# Patient Record
Sex: FEMALE | Race: WHITE | NOT HISPANIC OR LATINO | Employment: OTHER | URBAN - METROPOLITAN AREA
[De-identification: names, ages, dates, MRNs, and addresses within clinical notes are randomized per-mention and may not be internally consistent; named-entity substitution may affect disease eponyms.]

---

## 2017-01-05 ENCOUNTER — ALLSCRIPTS OFFICE VISIT (OUTPATIENT)
Dept: OTHER | Facility: OTHER | Age: 82
End: 2017-01-05

## 2017-02-02 ENCOUNTER — ALLSCRIPTS OFFICE VISIT (OUTPATIENT)
Dept: OTHER | Facility: OTHER | Age: 82
End: 2017-02-02

## 2017-02-15 ENCOUNTER — ALLSCRIPTS OFFICE VISIT (OUTPATIENT)
Dept: OTHER | Facility: OTHER | Age: 82
End: 2017-02-15

## 2017-02-15 DIAGNOSIS — I10 ESSENTIAL (PRIMARY) HYPERTENSION: ICD-10-CM

## 2017-02-15 DIAGNOSIS — E03.9 HYPOTHYROIDISM: ICD-10-CM

## 2017-02-16 ENCOUNTER — TRANSCRIBE ORDERS (OUTPATIENT)
Dept: LAB | Facility: CLINIC | Age: 82
End: 2017-02-16

## 2017-02-16 ENCOUNTER — APPOINTMENT (OUTPATIENT)
Dept: LAB | Facility: CLINIC | Age: 82
End: 2017-02-16
Payer: MEDICARE

## 2017-02-16 DIAGNOSIS — E03.9 HYPOTHYROIDISM: ICD-10-CM

## 2017-02-16 DIAGNOSIS — I10 ESSENTIAL (PRIMARY) HYPERTENSION: ICD-10-CM

## 2017-02-16 LAB
ALBUMIN SERPL BCP-MCNC: 3.7 G/DL (ref 3.5–5)
ALP SERPL-CCNC: 102 U/L (ref 46–116)
ALT SERPL W P-5'-P-CCNC: 21 U/L (ref 12–78)
ANION GAP SERPL CALCULATED.3IONS-SCNC: 8 MMOL/L (ref 4–13)
AST SERPL W P-5'-P-CCNC: 15 U/L (ref 5–45)
BASOPHILS # BLD AUTO: 0.04 THOUSANDS/ΜL (ref 0–0.1)
BASOPHILS NFR BLD AUTO: 1 % (ref 0–1)
BILIRUB SERPL-MCNC: 0.64 MG/DL (ref 0.2–1)
BUN SERPL-MCNC: 14 MG/DL (ref 5–25)
CALCIUM SERPL-MCNC: 9.2 MG/DL (ref 8.3–10.1)
CHLORIDE SERPL-SCNC: 104 MMOL/L (ref 100–108)
CO2 SERPL-SCNC: 30 MMOL/L (ref 21–32)
CREAT SERPL-MCNC: 0.7 MG/DL (ref 0.6–1.3)
EOSINOPHIL # BLD AUTO: 0.05 THOUSAND/ΜL (ref 0–0.61)
EOSINOPHIL NFR BLD AUTO: 1 % (ref 0–6)
ERYTHROCYTE [DISTWIDTH] IN BLOOD BY AUTOMATED COUNT: 13.4 % (ref 11.6–15.1)
GFR SERPL CREATININE-BSD FRML MDRD: >60 ML/MIN/1.73SQ M
GLUCOSE SERPL-MCNC: 102 MG/DL (ref 65–140)
HCT VFR BLD AUTO: 42.2 % (ref 34.8–46.1)
HGB BLD-MCNC: 13.7 G/DL (ref 11.5–15.4)
LYMPHOCYTES # BLD AUTO: 1.99 THOUSANDS/ΜL (ref 0.6–4.47)
LYMPHOCYTES NFR BLD AUTO: 42 % (ref 14–44)
MCH RBC QN AUTO: 29.9 PG (ref 26.8–34.3)
MCHC RBC AUTO-ENTMCNC: 32.5 G/DL (ref 31.4–37.4)
MCV RBC AUTO: 92 FL (ref 82–98)
MONOCYTES # BLD AUTO: 0.38 THOUSAND/ΜL (ref 0.17–1.22)
MONOCYTES NFR BLD AUTO: 8 % (ref 4–12)
NEUTROPHILS # BLD AUTO: 2.24 THOUSANDS/ΜL (ref 1.85–7.62)
NEUTS SEG NFR BLD AUTO: 48 % (ref 43–75)
NRBC BLD AUTO-RTO: 1 /100 WBCS
PLATELET # BLD AUTO: 259 THOUSANDS/UL (ref 149–390)
PMV BLD AUTO: 11.5 FL (ref 8.9–12.7)
POTASSIUM SERPL-SCNC: 4.4 MMOL/L (ref 3.5–5.3)
PROT SERPL-MCNC: 7.8 G/DL (ref 6.4–8.2)
RBC # BLD AUTO: 4.58 MILLION/UL (ref 3.81–5.12)
SODIUM SERPL-SCNC: 142 MMOL/L (ref 136–145)
T4 FREE SERPL-MCNC: 1.09 NG/DL (ref 0.76–1.46)
TSH SERPL DL<=0.05 MIU/L-ACNC: 3.9 UIU/ML (ref 0.36–3.74)
WBC # BLD AUTO: 4.71 THOUSAND/UL (ref 4.31–10.16)

## 2017-02-16 PROCEDURE — 85025 COMPLETE CBC W/AUTO DIFF WBC: CPT

## 2017-02-16 PROCEDURE — 80053 COMPREHEN METABOLIC PANEL: CPT

## 2017-02-16 PROCEDURE — 36415 COLL VENOUS BLD VENIPUNCTURE: CPT

## 2017-02-16 PROCEDURE — 84439 ASSAY OF FREE THYROXINE: CPT

## 2017-02-16 PROCEDURE — 84443 ASSAY THYROID STIM HORMONE: CPT

## 2017-04-20 ENCOUNTER — ALLSCRIPTS OFFICE VISIT (OUTPATIENT)
Dept: OTHER | Facility: OTHER | Age: 82
End: 2017-04-20

## 2017-05-05 ENCOUNTER — ALLSCRIPTS OFFICE VISIT (OUTPATIENT)
Dept: OTHER | Facility: OTHER | Age: 82
End: 2017-05-05

## 2017-06-28 ENCOUNTER — APPOINTMENT (OUTPATIENT)
Dept: LAB | Facility: CLINIC | Age: 82
End: 2017-06-28
Payer: MEDICARE

## 2017-06-28 ENCOUNTER — ALLSCRIPTS OFFICE VISIT (OUTPATIENT)
Dept: OTHER | Facility: OTHER | Age: 82
End: 2017-06-28

## 2017-06-28 ENCOUNTER — GENERIC CONVERSION - ENCOUNTER (OUTPATIENT)
Dept: OTHER | Facility: OTHER | Age: 82
End: 2017-06-28

## 2017-06-28 DIAGNOSIS — E03.9 HYPOTHYROIDISM: ICD-10-CM

## 2017-06-28 DIAGNOSIS — I10 ESSENTIAL (PRIMARY) HYPERTENSION: ICD-10-CM

## 2017-06-28 LAB
ALBUMIN SERPL BCP-MCNC: 3.9 G/DL (ref 3.5–5)
ALP SERPL-CCNC: 98 U/L (ref 46–116)
ALT SERPL W P-5'-P-CCNC: 21 U/L (ref 12–78)
ANION GAP SERPL CALCULATED.3IONS-SCNC: 5 MMOL/L (ref 4–13)
AST SERPL W P-5'-P-CCNC: 19 U/L (ref 5–45)
BASOPHILS # BLD AUTO: 0.03 THOUSANDS/ΜL (ref 0–0.1)
BASOPHILS NFR BLD AUTO: 1 % (ref 0–1)
BILIRUB SERPL-MCNC: 0.62 MG/DL (ref 0.2–1)
BUN SERPL-MCNC: 16 MG/DL (ref 5–25)
CALCIUM SERPL-MCNC: 9.3 MG/DL (ref 8.3–10.1)
CHLORIDE SERPL-SCNC: 103 MMOL/L (ref 100–108)
CO2 SERPL-SCNC: 32 MMOL/L (ref 21–32)
CREAT SERPL-MCNC: 0.69 MG/DL (ref 0.6–1.3)
EOSINOPHIL # BLD AUTO: 0.07 THOUSAND/ΜL (ref 0–0.61)
EOSINOPHIL NFR BLD AUTO: 1 % (ref 0–6)
ERYTHROCYTE [DISTWIDTH] IN BLOOD BY AUTOMATED COUNT: 13.7 % (ref 11.6–15.1)
GFR SERPL CREATININE-BSD FRML MDRD: >60 ML/MIN/1.73SQ M
GLUCOSE P FAST SERPL-MCNC: 85 MG/DL (ref 65–99)
HCT VFR BLD AUTO: 40.9 % (ref 34.8–46.1)
HGB BLD-MCNC: 13.2 G/DL (ref 11.5–15.4)
LYMPHOCYTES # BLD AUTO: 2.19 THOUSANDS/ΜL (ref 0.6–4.47)
LYMPHOCYTES NFR BLD AUTO: 37 % (ref 14–44)
MCH RBC QN AUTO: 29.9 PG (ref 26.8–34.3)
MCHC RBC AUTO-ENTMCNC: 32.3 G/DL (ref 31.4–37.4)
MCV RBC AUTO: 93 FL (ref 82–98)
MONOCYTES # BLD AUTO: 0.39 THOUSAND/ΜL (ref 0.17–1.22)
MONOCYTES NFR BLD AUTO: 7 % (ref 4–12)
NEUTROPHILS # BLD AUTO: 3.22 THOUSANDS/ΜL (ref 1.85–7.62)
NEUTS SEG NFR BLD AUTO: 54 % (ref 43–75)
NRBC BLD AUTO-RTO: 0 /100 WBCS
PLATELET # BLD AUTO: 273 THOUSANDS/UL (ref 149–390)
PMV BLD AUTO: 11.8 FL (ref 8.9–12.7)
POTASSIUM SERPL-SCNC: 4 MMOL/L (ref 3.5–5.3)
PROT SERPL-MCNC: 8.2 G/DL (ref 6.4–8.2)
RBC # BLD AUTO: 4.42 MILLION/UL (ref 3.81–5.12)
SODIUM SERPL-SCNC: 140 MMOL/L (ref 136–145)
T4 FREE SERPL-MCNC: 1.21 NG/DL (ref 0.76–1.46)
TSH SERPL DL<=0.05 MIU/L-ACNC: 4.81 UIU/ML (ref 0.36–3.74)
WBC # BLD AUTO: 5.91 THOUSAND/UL (ref 4.31–10.16)

## 2017-06-28 PROCEDURE — 84443 ASSAY THYROID STIM HORMONE: CPT

## 2017-06-28 PROCEDURE — 80053 COMPREHEN METABOLIC PANEL: CPT

## 2017-06-28 PROCEDURE — 85025 COMPLETE CBC W/AUTO DIFF WBC: CPT

## 2017-06-28 PROCEDURE — 84439 ASSAY OF FREE THYROXINE: CPT

## 2017-07-20 ENCOUNTER — ALLSCRIPTS OFFICE VISIT (OUTPATIENT)
Dept: OTHER | Facility: OTHER | Age: 82
End: 2017-07-20

## 2017-08-07 ENCOUNTER — ALLSCRIPTS OFFICE VISIT (OUTPATIENT)
Dept: OTHER | Facility: OTHER | Age: 82
End: 2017-08-07

## 2017-08-15 ENCOUNTER — APPOINTMENT (OUTPATIENT)
Dept: LAB | Facility: CLINIC | Age: 82
End: 2017-08-15
Payer: MEDICARE

## 2017-08-15 ENCOUNTER — GENERIC CONVERSION - ENCOUNTER (OUTPATIENT)
Dept: OTHER | Facility: OTHER | Age: 82
End: 2017-08-15

## 2017-08-15 ENCOUNTER — ALLSCRIPTS OFFICE VISIT (OUTPATIENT)
Dept: OTHER | Facility: OTHER | Age: 82
End: 2017-08-15

## 2017-08-15 DIAGNOSIS — R21 RASH AND OTHER NONSPECIFIC SKIN ERUPTION: ICD-10-CM

## 2017-08-15 LAB
BASOPHILS # BLD AUTO: 0.02 THOUSANDS/ΜL (ref 0–0.1)
BASOPHILS NFR BLD AUTO: 0 % (ref 0–1)
EOSINOPHIL # BLD AUTO: 0.02 THOUSAND/ΜL (ref 0–0.61)
EOSINOPHIL NFR BLD AUTO: 0 % (ref 0–6)
ERYTHROCYTE [DISTWIDTH] IN BLOOD BY AUTOMATED COUNT: 13.2 % (ref 11.6–15.1)
HCT VFR BLD AUTO: 42.8 % (ref 34.8–46.1)
HGB BLD-MCNC: 13.7 G/DL (ref 11.5–15.4)
LYMPHOCYTES # BLD AUTO: 1.79 THOUSANDS/ΜL (ref 0.6–4.47)
LYMPHOCYTES NFR BLD AUTO: 37 % (ref 14–44)
MCH RBC QN AUTO: 29.5 PG (ref 26.8–34.3)
MCHC RBC AUTO-ENTMCNC: 32 G/DL (ref 31.4–37.4)
MCV RBC AUTO: 92 FL (ref 82–98)
MONOCYTES # BLD AUTO: 0.34 THOUSAND/ΜL (ref 0.17–1.22)
MONOCYTES NFR BLD AUTO: 7 % (ref 4–12)
NEUTROPHILS # BLD AUTO: 2.72 THOUSANDS/ΜL (ref 1.85–7.62)
NEUTS SEG NFR BLD AUTO: 56 % (ref 43–75)
NRBC BLD AUTO-RTO: 0 /100 WBCS
PLATELET # BLD AUTO: 290 THOUSANDS/UL (ref 149–390)
PMV BLD AUTO: 11.4 FL (ref 8.9–12.7)
RBC # BLD AUTO: 4.65 MILLION/UL (ref 3.81–5.12)
WBC # BLD AUTO: 4.9 THOUSAND/UL (ref 4.31–10.16)

## 2017-08-15 PROCEDURE — 85025 COMPLETE CBC W/AUTO DIFF WBC: CPT

## 2017-08-15 PROCEDURE — 36415 COLL VENOUS BLD VENIPUNCTURE: CPT

## 2017-08-16 ENCOUNTER — TRANSCRIBE ORDERS (OUTPATIENT)
Dept: LAB | Facility: CLINIC | Age: 82
End: 2017-08-16

## 2017-10-19 ENCOUNTER — ALLSCRIPTS OFFICE VISIT (OUTPATIENT)
Dept: OTHER | Facility: OTHER | Age: 82
End: 2017-10-19

## 2017-10-20 NOTE — PROGRESS NOTES
Current Meds   1  AmLODIPine Besylate 5 MG Oral Tablet; Take 1 tablet daily  Requested for: 09YVC4477;   Last Rx:60Yxi2779 Ordered   2  Botox 100 UNIT Injection Solution Reconstituted; IMPLANT 300  UNIT Intramuscular; To   Be Done: 90NHC4425; Status: HOLD FOR - Administration Ordered   3  Botox 200 UNIT Injection Solution Reconstituted; INJECT 300  UNIT Intramuscular; To Be   Done: 55XBR0830; Status: HOLD FOR - Administration Ordered   4  Cephalexin 250 MG/5ML Oral Suspension Reconstituted; SWALLOW 5 ML 3 times daily; Therapy: 54HRY6446 to (Evaluate:16Spu7847)  Requested for: 37LZX3121; Last   Rx:08Eot2820 Ordered   5  Gabapentin 100 MG Oral Capsule; TAKE 1 CAPSULE TWICE DAILY; Therapy: 42DJN5953 to [phone]  Requested for: 98QMW5699; Last   Rx:66Bwr1201 Ordered   6  Levothyroxine Sodium 50 MCG Oral Tablet; Take 1 tablet daily  Requested for:   28Jun2017; Last Rx:28Jun2017 Ordered   7  Vitamin D3 1000 UNIT Oral Capsule; take 1 capsule daily; Therapy: (Recorded:44Dvk1784) to Recorded    Allergies  1  No Known Drug Allergies  2  No Known Environmental Allergies   3  No Known Food Allergies    Procedure    Procedure Note:  BOTOX PROCEDURE    Indication: Spasm  Three 100 unit vial's of Botox each from Lot number [id number] with an expiration April 2020 were diluted with 2 mL of normal saline to produce a 50 unit per cc dilution  Following aseptic technique a hollow bore 37 mm 27-gauge injectable EMG needle was utilized to identify various muscles in the cervical region  EMG demonstrated rapid firing normal-appearing motor potentials   The following muscles were infiltrated:    Right Splenius Capitis                     50 Units (2 Sites)  Right Trapezius                                 25 Units (2 Sites)  Left Trapezius                                    25 Units (2 Sites)  Left Sternocleidomastoid              100 Units (3 Sites)  Right Sternocleidomastoid            75 units (3 sites)    Total Botox Injected                       275 units  Total Botox wasted                           25 units    Any minimal bleeding was controlled with pressure  The patient tolerated the procedure well with no complications  There maintaining Botox was discarded as no appropriate Medicare patients were being done  Anticipate reinjection in 3 months       Plan  Torticollis, spasmodic    · Botox 200 UNIT Injection Solution Reconstituted   Rx By: Cathy Magana; For: Torticollis, spasmodic; Dose of 300 ML;  Intramuscular; JANNA = N; Administered by: Lakisha Salinas: 10/19/2017 12:37:00 PM; Last Updated By: Lakisha Salinas; 10/19/2017 12:39:26 PM    Future Appointments    Date/Time Provider Specialty Site   11/01/2017 01:40 PM Debbie Holliday MD Internal Medicine [facility] INTERNAL MED   10/31/2017 01:40 PM Cathy Magana MD Neurology [facility] [address] [facility]   Electronically signed by : Rosy Diego MD; Oct 19 2017  1:00PM EST                       (Author)

## 2017-10-31 ENCOUNTER — GENERIC CONVERSION - ENCOUNTER (OUTPATIENT)
Dept: OTHER | Facility: OTHER | Age: 82
End: 2017-10-31

## 2017-11-01 ENCOUNTER — ALLSCRIPTS OFFICE VISIT (OUTPATIENT)
Dept: OTHER | Facility: OTHER | Age: 82
End: 2017-11-01

## 2017-11-01 DIAGNOSIS — E03.9 HYPOTHYROIDISM: ICD-10-CM

## 2017-11-02 ENCOUNTER — APPOINTMENT (OUTPATIENT)
Dept: LAB | Facility: CLINIC | Age: 82
End: 2017-11-02
Payer: MEDICARE

## 2017-11-02 DIAGNOSIS — E03.9 HYPOTHYROIDISM: ICD-10-CM

## 2017-11-02 LAB
T4 FREE SERPL-MCNC: 1.17 NG/DL (ref 0.76–1.46)
TSH SERPL DL<=0.05 MIU/L-ACNC: 4.82 UIU/ML (ref 0.36–3.74)

## 2017-11-02 PROCEDURE — 84439 ASSAY OF FREE THYROXINE: CPT

## 2017-11-02 PROCEDURE — 84443 ASSAY THYROID STIM HORMONE: CPT

## 2017-11-02 PROCEDURE — 36415 COLL VENOUS BLD VENIPUNCTURE: CPT

## 2017-11-02 NOTE — PROGRESS NOTES
Assessment  1  Benign hypertension (401 1) (I10)   2  Hypothyroidism (244 9) (E03 9)   3  Facial rash (782 1) (R21)    Plan  Benign hypertension    · AmLODIPine Besylate 5 MG Oral Tablet; Take 1 tablet daily  Facial rash    · Bacitracin 500 UNIT/GM External Ointment; APPLY SPARINGLY TO AFFECTED AREA(S) 3  TIMES A DAY   · 1 - Sveta HARDING, [employer] [facility]  (Dermatology) Co-Management  *  Status: Hold For - Scheduling  Requested  for: 42JIK9692  Care Summary provided  : Yes  Hypothyroidism    · Levothyroxine Sodium 50 MCG Oral Tablet; Take 1 tablet daily   · (1) T4, FREE; Status:Active; Requested for:01Nov2017;    · (1) TSH; Status:Active; Requested for:01Nov2017;    · Follow-up visit in 4 Months Evaluation and Treatment  Follow-up  Status: Hold For - Scheduling   Requested for: 75SRS1204  Need for influenza vaccination    · Fluzone High-Dose 0 5 ML Intramuscular Suspension Prefilled Syringe  Tremors of nervous system    · Gabapentin 100 MG Oral Capsule; TAKE 1 CAPSULE TWICE DAILY    Discussion/Summary  Discussion Summary:   Continue current medications  Order blood work  Continue follow-up with specialists  Since she did seem to improve with the anabiotic's, we can try bacitracin ointment  She was very reluctant to take any pills  Will also order dermatology referral    Counseling Documentation With Imm: The patient was counseled regarding instructions for management,-- impressions  Medication SE Review and Pt Understands Tx: Possible side effects of new medications were reviewed with the patient/guardian today  The treatment plan was reviewed with the patient/guardian  The patient/guardian understands and agrees with the treatment plan   Self Referrals:   Self Referrals: No      Chief Complaint  Chief Complaint Free Text Note Form: follow up  needs refill on her medication      History of Present Illness  HPI: Patient comes in today for follow up  Her thyroid levels have been controlled but she is due for blood work   She is taking her medicine as directed  Her blood pressure is controlled  She continues to follow with neurology and received another Botox injection about 2 weeks ago  She reports that the rash on her nose was improving but she had a very bad time with the antibiotic pills  This skin is still scaly and just won't go away completely  Review of Systems  Complete-Female:   Cardiovascular: no chest pain  Respiratory: no shortness of breath  Gastrointestinal: no abdominal pain  Active Problems  1  Advance directive discussed with patient (V65 49) (Z71 89)   2  Benign hypertension (401 1) (I10)   3  Facial rash (782 1) (R21)   4  History of right foot drop (V13 59) (Z87 39)   5  Hypothyroidism (244 9) (E03 9)   6  Loss of height (781 91) (R29 890)   7  Need for influenza vaccination (V04 81) (Z23)   8  Need for prophylactic vaccination against Streptococcus pneumoniae (pneumococcus) (V03 82)   (Z23)   9  Osteoporosis (733 00) (M81 0)   10  Torticollis, 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)   11  Tremors of nervous system (781 0) (R25 1)    Past Medical History  1  History of Need for influenza vaccination (V04 81) (Z23)  Active Problems And Past Medical History Reviewed: The active problems and past medical history were reviewed and updated today  Surgical History  1  History of Total Hip Replacement   2  History of Treatment Of Lower Leg Fracture    Family History  Mother    1  Family history of malignant neoplasm (V16 9) (Z80 9)  Father    2  FH: heart attack (V17 3) (Z82 49)    Social History   · Caffeine use (V49 89) (F15 90)   ·    · Former smoker (V15 82) (I66 184)   · No illicit drug use   · Occasional alcohol use   · Retired    Current Meds   1  AmLODIPine Besylate 5 MG Oral Tablet; Take 1 tablet daily  Requested for: 32AEY3841; Last   Rx:81Gyt6328 Ordered   2  Botox 100 UNIT Injection Solution Reconstituted; IMPLANT 300  UNIT Intramuscular;  To Be Done:   74YSA3147; Status: HOLD FOR - Administration Ordered   3  Botox 200 UNIT Injection Solution Reconstituted; INJECT 300  UNIT Intramuscular; To Be Done:   61ZOF3518; Status: HOLD FOR - Administration Ordered   4  Gabapentin 100 MG Oral Capsule; TAKE 1 CAPSULE TWICE DAILY; Therapy: 60ROH0729 to [phone]  Requested for: 74CRK8175; Last Rx:28Jun2017   Ordered   5  Levothyroxine Sodium 50 MCG Oral Tablet; Take 1 tablet daily  Requested for: 28Jun2017; Last   Rx:28Jun2017 Ordered   6  Vitamin D3 1000 UNIT Oral Capsule; take 1 capsule daily; Therapy: (Recorded:43Bxx4595) to Recorded  Medication List Reviewed: The medication list was reviewed and updated today  Allergies  1  No Known Drug Allergies  2  No Known Environmental Allergies   3  No Known Food Allergies    Vitals  Vital Signs    Recorded: 01XEJ2416 01:37PM   Heart Rate 662   Systolic 722   Diastolic 80   Height 4 ft 10 in   Weight 80 lb    BMI Calculated 16 72   BSA Calculated 1 23   O2 Saturation 94     Physical Exam    Constitutional   General appearance: No acute distress, well appearing and well nourished  Pulmonary   Respiratory effort: No increased work of breathing or signs of respiratory distress  Auscultation of lungs: Clear to auscultation  Cardiovascular   Auscultation of heart: Normal rate and rhythm, normal S1 and S2, without murmurs  Examination of extremities for edema and/or varicosities: Normal     Abdomen   Abdomen: Non-tender, no masses  Liver and spleen: No hepatomegaly or splenomegaly  Skin   Skin and subcutaneous tissue: Abnormal  -- Her nose looks better, scaly at this point some erythema          Future Appointments    Date/Time Provider Specialty Site   01/18/2018 12:30 PM Shirley Wells MD Neurology [facility]   02/02/2018 12:40 PM Shirley Wells MD Neurology [facility]     Signatures   Electronically signed by : Deandre Dowd MD; Nov 1 2017  1:52PM EST                       (Author)

## 2017-11-03 ENCOUNTER — GENERIC CONVERSION - ENCOUNTER (OUTPATIENT)
Dept: OTHER | Facility: OTHER | Age: 82
End: 2017-11-03

## 2018-01-11 NOTE — RESULT NOTES
Verified Results  (1) T4, FREE 02Nov2017 12:50PM Doc Boozer Order Number: [id number]     Test Name Result Flag Reference   T4,FREE 1 17 ng/dL  0 76-1 46   Specimen collection should occur prior to Sulfasalazine administration due to the potential for falsely elevated results  (1) TSH 47HYL1713 12:50PM Doc Boozer Order Number: [id number]     Test Name Result Flag Reference   TSH 4 820 uIU/mL H 0 358-3 740   Patients undergoing fluorescein dye angiography may retain small amounts of fluorescein in the body for 48-72 hours post procedure  Samples containing fluorescein can produce falsely depressed TSH values  If the patient had this procedure,a specimen should be resubmitted post fluorescein clearance            The recommended reference ranges for TSH during pregnancy are as follows:  First trimester 0 1 to 2 5 uIU/mL  Second trimester  0 2 to 3 0 uIU/mL  Third trimester 0 3 to 3 0 uIU/m

## 2018-01-12 VITALS
DIASTOLIC BLOOD PRESSURE: 100 MMHG | BODY MASS INDEX: 16.81 KG/M2 | HEART RATE: 120 BPM | OXYGEN SATURATION: 97 % | WEIGHT: 83.38 LBS | HEIGHT: 59 IN | SYSTOLIC BLOOD PRESSURE: 142 MMHG

## 2018-01-12 NOTE — RESULT NOTES
Message   Labs okay  No changes in meds required  Verified Results  (1) T4, FREE 03Aug2016 12:02PM Sharon Lindquist Order Number: [id number]     Test Name Result Flag Reference   T4,FREE 1 04 ng/dL  0 76-1 46   - Patient Instructions: This bloodwork is non-fasting  Please drink two glasses of water morning of bloodwork  (1) TSH 03Aug2016 12:02PM Sharon Lindquist Order Number: [id number]     Test Name Result Flag Reference   TSH 5 430 uIU/mL H 0 358-3 740   - Patient Instructions: This bloodwork is non-fasting  Please drink two glasses of water morning of bloodwork  - Patient Instructions: This bloodwork is non-fasting  Please drink two glasses of water morning of bloodwork  Patients undergoing fluorescein dye angiography may retain small amounts of fluorescein in the body for 48-72 hours post procedure  Samples containing fluorescein can produce falsely depressed TSH values  If the patient had this procedure,a specimen should be resubmitted post fluorescein clearance  The recommended reference ranges for TSH during pregnancy are as follows:  First trimester 0 1 to 2 5 uIU/mL  Second trimester  0 2 to 3 0 uIU/mL  Third trimester 0 3 to 3 0 uIU/m     (1) COMPREHENSIVE METABOLIC PANEL 42DNJ7115 11:94TS Sharon Lindquist Order Number: [id number]     Test Name Result Flag Reference   GLUCOSE,RANDM 95 mg/dL     If the patient is fasting, the ADA then defines impaired fasting glucose as > 100 mg/dL and diabetes as > or equal to 123 mg/dL     SODIUM 141 mmol/L  136-145   POTASSIUM 3 6 mmol/L  3 5-5 3   CHLORIDE 103 mmol/L  100-108   CARBON DIOXIDE 31 mmol/L  21-32   ANION GAP (CALC) 7 mmol/L  4-13   BLOOD UREA NITROGEN 15 mg/dL  5-25   CREATININE 0 71 mg/dL  0 60-1 30   Standardized to IDMS reference method   CALCIUM 9 5 mg/dL  8 3-10 1   BILI, TOTAL 0 55 mg/dL  0 20-1 00   ALK PHOSPHATAS 84 U/L     ALT (SGPT) 20 U/L  12-78   AST(SGOT) 17 U/L  5-45   ALBUMIN 3 9 g/dL  3 5-5 0   TOTAL PROTEIN 7 7 g/dL  6 4-8 2   eGFR Non-African American      >60 0 ml/min/1 73sq m   - Patient Instructions: This bloodwork is non-fasting  Please drink two glasses of water morning of bloodwork  National Kidney Disease Education Program recommendations are as follows:  GFR calculation is accurate only with a steady state creatinine  Chronic Kidney disease less than 60 ml/min/1 73 sq  meters  Kidney failure less than 15 ml/min/1 73 sq  meters  (1) CBC/PLT/DIFF 27Bbb6845 12:02PM Mayda Wright Order Number: [id number]     Test Name Result Flag Reference   WBC COUNT 6 46 Thousand/uL  4 31-10 16   RBC COUNT 4 54 Million/uL  3 81-5 12   HEMOGLOBIN 13 6 g/dL  11 5-15 4   HEMATOCRIT 41 8 %  34 8-46  1   MCV 92 fL  82-98   MCH 30 0 pg  26 8-34 3   MCHC 32 5 g/dL  31 4-37 4   RDW 13 4 %  11 6-15 1   MPV 12 0 fL  8 9-12 7   PLATELET COUNT 351 Thousands/uL  149-390   nRBC AUTOMATED 0 /100 WBCs     NEUTROPHILS RELATIVE PERCENT 59 %  43-75   LYMPHOCYTES RELATIVE PERCENT 33 %  14-44   MONOCYTES RELATIVE PERCENT 6 %  4-12   EOSINOPHILS RELATIVE PERCENT 1 %  0-6   BASOPHILS RELATIVE PERCENT 1 %  0-1   NEUTROPHILS ABSOLUTE COUNT 3 90 Thousands/?L  1 85-7 62   LYMPHOCYTES ABSOLUTE COUNT 2 11 Thousands/?L  0 60-4 47   MONOCYTES ABSOLUTE COUNT 0 38 Thousand/?L  0 17-1 22   EOSINOPHILS ABSOLUTE COUNT 0 04 Thousand/?L  0 00-0 61   BASOPHILS ABSOLUTE COUNT 0 03 Thousands/?L  0 00-0 10   - Patient Instructions: This bloodwork is non-fasting  Please drink two glasses of water morning of bloodwork  - Patient Instructions: This bloodwork is non-fasting  Please drink two glasses of water morning of bloodwork

## 2018-01-13 VITALS
WEIGHT: 81 LBS | DIASTOLIC BLOOD PRESSURE: 88 MMHG | OXYGEN SATURATION: 97 % | HEART RATE: 109 BPM | HEIGHT: 55 IN | SYSTOLIC BLOOD PRESSURE: 140 MMHG | BODY MASS INDEX: 18.74 KG/M2

## 2018-01-13 VITALS
OXYGEN SATURATION: 94 % | HEIGHT: 58 IN | HEART RATE: 100 BPM | BODY MASS INDEX: 16.79 KG/M2 | SYSTOLIC BLOOD PRESSURE: 120 MMHG | WEIGHT: 80 LBS | DIASTOLIC BLOOD PRESSURE: 80 MMHG

## 2018-01-13 VITALS
SYSTOLIC BLOOD PRESSURE: 120 MMHG | WEIGHT: 80 LBS | DIASTOLIC BLOOD PRESSURE: 82 MMHG | HEART RATE: 107 BPM | HEIGHT: 59 IN | BODY MASS INDEX: 16.13 KG/M2

## 2018-01-13 VITALS
BODY MASS INDEX: 16.37 KG/M2 | SYSTOLIC BLOOD PRESSURE: 119 MMHG | DIASTOLIC BLOOD PRESSURE: 78 MMHG | HEIGHT: 59 IN | HEART RATE: 105 BPM | WEIGHT: 81.19 LBS

## 2018-01-13 VITALS
SYSTOLIC BLOOD PRESSURE: 122 MMHG | WEIGHT: 81.13 LBS | HEART RATE: 106 BPM | HEIGHT: 59 IN | BODY MASS INDEX: 16.36 KG/M2 | DIASTOLIC BLOOD PRESSURE: 84 MMHG

## 2018-01-13 VITALS
BODY MASS INDEX: 16.2 KG/M2 | SYSTOLIC BLOOD PRESSURE: 104 MMHG | WEIGHT: 80.38 LBS | OXYGEN SATURATION: 91 % | HEIGHT: 59 IN | HEART RATE: 91 BPM | DIASTOLIC BLOOD PRESSURE: 62 MMHG

## 2018-01-14 VITALS — HEART RATE: 87 BPM | SYSTOLIC BLOOD PRESSURE: 100 MMHG | DIASTOLIC BLOOD PRESSURE: 58 MMHG | TEMPERATURE: 98.4 F

## 2018-01-16 NOTE — RESULT NOTES
Message   Numbers improving  Stay with present dosing and repeat labs in 6 weeks     Verified Results  (1) T4, FREE 41Orh8128 10:42AM Ibeth Ring     Test Name Result Flag Reference   T4,FREE 1 15 ng/dL  0 76-1 46     (1) TSH 64HKI0071 10:42AM Ibeth Ring   Patients undergoing fluorescein dye angiography may retain small amounts of fluorescein in the body for 48-72 hours post procedure  Samples containing fluorescein can produce falsely depressed TSH values  If the patient had this procedure,a specimen should be resubmitted post fluorescein clearance          The recommended reference ranges for TSH during pregnancy are as follows:  First trimester 0 1 to 2 5 uIU/mL  Second trimester  0 2 to 3 0 uIU/mL  Third trimester 0 3 to 3 0 uIU/m     Test Name Result Flag Reference   TSH 4 130 uIU/mL H 0 358-3 740

## 2018-01-18 NOTE — RESULT NOTES
Verified Results  * DXA BONE DENSITY SPINE HIP AND PELVIS 73Sbc2833 10:39AM Chuckie Gonzalez     Test Name Result Flag Reference   DXA BONE DENSITY SPINE HIP AND PELVIS (Report)     CENTRAL DXA SCAN     CLINICAL HISTORY:  80year old post-menopausal  female risk factors include history of hypothyroidism  Bilateral hip replacements  Scoliosis  Osteoporosis screening  TECHNIQUE: Bone densitometry was performed using a H[employer] Horizon C bone densitometer  Regions of interest appear properly placed  There are no obvious fractures or other confounding variables which could limit the study  Advanced degenerative changes   lumbar spine with scoliosis, concave right  COMPARISON: None  RESULTS:    LUMBAR SPINE: L1-L4:   BMD 0 713 gm/cm2   T-score -3 0   Z-score -0 3     LEFT FOREARM :   BMD 0 438 gm/sq-cm,   T-score is -4 2   Z-score is -0 8          IMPRESSION:   1  Based on the [facility] classification, the T-score of -4 2 in the left forearm is consistent with osteoporosis  2  According to the [address] [place] [address], prescription therapy is recommended with a T-score of -2 5 or less in the spine or hip after appropriate evaluation to exclude secondary causes  3  A daily intake of at least 1200 mg Calcium and 800 to 1000 IU of Vitamin D, as well as weight bearing and muscle strengthening exercise, fall prevention and avoidance of tobacco and excessive alcohol intake as basic preventive measures are    suggested  4  Repeat DXA scan in 18-24 months as clinically indicated           WHO CLASSIFICATION:   Normal (a T-score of -1 0 or higher)   Low bone mineral density (a T-score of less than -1 0 but higher than -2 5)   Osteoporosis (a T-score of -2 5 or less)   Severe osteoporosis (a T-score of -2 5 or less with a fragility fracture)             Workstation performed: [id number]     Signed by:   Florencio Ryan DO   7/27/16

## 2018-01-18 NOTE — RESULT NOTES
Message   Labs look good at this time  Please send her a lab slip to do her free T4, TSH at the endo of May before her next appt  Thanks     Verified Results  (1) T4, FREE 00Ynj8662 10:24AM [facility] Order Number: [id number]     Test Name Result Flag Reference   T4,FREE 1 06 ng/dL  0 76-1 46     (1) TSH 87LIR7773 10:24AM [employer] Order Number: [id number]    Patients undergoing fluorescein dye angiography may retain small amounts of fluorescein in the body for 48-72 hours post procedure  Samples containing fluorescein can produce falsely depressed TSH values  If the patient had this procedure,a specimen should be resubmitted post fluorescein clearance  The recommended reference ranges for TSH during pregnancy are as follows:  First trimester 0 1 to 2 5 uIU/mL  Second trimester  0 2 to 3 0 uIU/mL  Third trimester 0 3 to 3 0 uIU/m     Test Name Result Flag Reference   TSH 5 250 uIU/mL H 0 358-3 740     (1) CBC/PLT/DIFF 06FIW7916 10:24AM Lind Rosebush Order Number: [id number]     Order Number: [id number]     Test Name Result Flag Reference   WBC COUNT 4 54 Thousand/uL  4 31-10 16   RBC COUNT 4 59 Million/uL  3 81-5 12   HEMOGLOBIN 13 6 g/dL  11 5-15 4   HEMATOCRIT 42 2 %  34 8-46  1   MCV 91 9 fL  82 0-98 0   MCH 29 6 pg  26 8-34 3   MCHC 32 2 g/dL  31 4-37 4   RDW 13 3 %  11 6-15 1   MPV 11 7 fL  8 9-12 7   PLATELET COUNT 120 Thousands/uL  149-390   nRBC AUTOMATED 0 /100 WBCs     NEUTROPHILS RELATIVE PERCENT 44 %  43-75   LYMPHOCYTES RELATIVE PERCENT 46 % H 14-44   MONOCYTES RELATIVE PERCENT 7 %  4-12   EOSINOPHILS RELATIVE PERCENT 2 %  0-6   BASOPHILS RELATIVE PERCENT 1 %  0-1   NEUTROPHILS ABSOLUTE COUNT 1 99 Thousands/µL  1 85-7 62   LYMPHOCYTES ABSOLUTE COUNT 2 08 Thousands/µL  0 60-4 47   MONOCYTES ABSOLUTE COUNT 0 31 Thousand/µL  0 17-1 22   EOSINOPHILS ABSOLUTE COUNT 0 10 Thousand/µL  0 00-0 61   BASOPHILS ABSOLUTE COUNT 0 05 Thousands/µL  0 00-0 10     (1) COMPREHENSIVE METABOLIC PANEL 80BVY6098 35:31WE [facility] Order Number: [id number]      National Kidney Disease Education Program recommendations are as follows:  GFR calculation is accurate only with a steady state creatinine  Chronic Kidney disease less than 60 ml/min/1 73 sq  meters  Kidney failure less than 15 ml/min/1 73 sq  meters  Test Name Result Flag Reference   GLUCOSE,RANDM 86 mg/dL     If the patient is fasting, the ADA then defines impaired fasting glucose as > 100 mg/dL and diabetes as > or equal to 123 mg/dL  SODIUM 138 mmol/L  136-145   POTASSIUM 3 8 mmol/L  3 5-5 3   CHLORIDE 102 mmol/L  100-108   CARBON DIOXIDE 28 mmol/L  21-32   ANION GAP (CALC) 8 mmol/L  4-13   BLOOD UREA NITROGEN 18 mg/dL  5-25   CREATININE 0 68 mg/dL  0 60-1 30   Standardized to IDMS reference method   CALCIUM 9 3 mg/dL  8 3-10 1   BILI, TOTAL 0 58 mg/dL  0 20-1 00   ALK PHOSPHATAS 88 U/L     ALT (SGPT) 16 U/L  12-78   AST(SGOT) 15 U/L  5-45   ALBUMIN 3 8 g/dL  3 5-5 0   TOTAL PROTEIN 7 8 g/dL  6 4-8 2   eGFR Non-African American      >60 0 ml/min/1 73sq m     (1) LIPID PANEL, FASTING 06ZDT2367 10:24AM Akbar Smith Order Number: [id number]      Triglyceride:         Normal              <150 mg/dl       Borderline High    150-199 mg/dl       High               200-499 mg/dl       Very High          >499 mg/dl  Cholesterol:         Desirable        <200 mg/dl      Borderline High  200-239 mg/dl      High             >239 mg/dl  HDL Cholesterol:        High    >59 mg/dL      Low     <41 mg/dL  LDL CALCULATED:    This screening LDL is a calculated result  It does not have the accuracy of the Direct Measured LDL in the monitoring of patients with hyperlipidemia and/or statin therapy  Direct Measure LDL (KSI367) must be ordered separately in these patients       Test Name Result Flag Reference   CHOLESTEROL 219 mg/dL H    HDL,DIRECT 76 mg/dL H 40-60   LDL CHOLESTEROL CALCULATED 117 mg/dL H 0-100 TRIGLYCERIDES 131 mg/dL  <=150

## 2018-01-18 NOTE — RESULT NOTES
Verified Results  (1) CBC/PLT/DIFF 28Jun2017 10:01AM Malissa Pearl Order Number: [id number]     Test Name Result Flag Reference   WBC COUNT 5 91 Thousand/uL  4 31-10 16   RBC COUNT 4 42 Million/uL  3 81-5 12   HEMOGLOBIN 13 2 g/dL  11 5-15 4   HEMATOCRIT 40 9 %  34 8-46  1   MCV 93 fL  82-98   MCH 29 9 pg  26 8-34 3   MCHC 32 3 g/dL  31 4-37 4   RDW 13 7 %  11 6-15 1   MPV 11 8 fL  8 9-12 7   PLATELET COUNT 449 Thousands/uL  149-390   nRBC AUTOMATED 0 /100 WBCs     NEUTROPHILS RELATIVE PERCENT 54 %  43-75   LYMPHOCYTES RELATIVE PERCENT 37 %  14-44   MONOCYTES RELATIVE PERCENT 7 %  4-12   EOSINOPHILS RELATIVE PERCENT 1 %  0-6   BASOPHILS RELATIVE PERCENT 1 %  0-1   NEUTROPHILS ABSOLUTE COUNT 3 22 Thousands/? ??L  1 85-7 62   LYMPHOCYTES ABSOLUTE COUNT 2 19 Thousands/? ??L  0 60-4 47   MONOCYTES ABSOLUTE COUNT 0 39 Thousand/? ??L  0 17-1 22   EOSINOPHILS ABSOLUTE COUNT 0 07 Thousand/? ??L  0 00-0 61   BASOPHILS ABSOLUTE COUNT 0 03 Thousands/? ??L  0 00-0 10     (1) COMPREHENSIVE METABOLIC PANEL [id number] 00:27NS Malissa Pearl Order Number: [id number]     Test Name Result Flag Reference   SODIUM 140 mmol/L  136-145   POTASSIUM 4 0 mmol/L  3 5-5 3   CHLORIDE 103 mmol/L  100-108   CARBON DIOXIDE 32 mmol/L  21-32   ANION GAP (CALC) 5 mmol/L  4-13   BLOOD UREA NITROGEN 16 mg/dL  5-25   CREATININE 0 69 mg/dL  0 60-1 30   Standardized to IDMS reference method   CALCIUM 9 3 mg/dL  8 3-10 1   BILI, TOTAL 0 62 mg/dL  0 20-1 00   ALK PHOSPHATAS 98 U/L     ALT (SGPT) 21 U/L  12-78   AST(SGOT) 19 U/L  5-45   ALBUMIN 3 9 g/dL  3 5-5 0   TOTAL PROTEIN 8 2 g/dL  6 4-8 2   eGFR Non-African American      >60 0 ml/min/1 73sq [facility] Disease Education Program recommendations are as follows:  GFR calculation is accurate only with a steady state creatinine  Chronic Kidney disease less than 60 ml/min/1 73 sq  meters  Kidney failure less than 15 ml/min/1 73 sq  meters     GLUCOSE FASTING 85 mg/dL 65-99     (1) T4, FREE 28Jun2017 10:01AM Luis Fernando Stevenson Order Number: [id number]     Test Name Result Flag Reference   T4,FREE 1 21 ng/dL  0 76-1 46     (1) TSH [id number] 10:01AM Luis Fernando Stevenson Order Number: [id number]     Test Name Result Flag Reference   TSH 4 810 uIU/mL H 0 358-3 740   Patients undergoing fluorescein dye angiography may retain small amounts of fluorescein in the body for 48-72 hours post procedure  Samples containing fluorescein can produce falsely depressed TSH values  If the patient had this procedure,a specimen should be resubmitted post fluorescein clearance            The recommended reference ranges for TSH during pregnancy are as follows:  First trimester 0 1 to 2 5 uIU/mL  Second trimester  0 2 to 3 0 uIU/mL  Third trimester 0 3 to 3 0 uIU/m

## 2018-01-18 NOTE — RESULT NOTES
Verified Results  (1) CBC/PLT/DIFF 13Hmc4528 11:43AM Raymon Kay Order Number: [id number]     Test Name Result Flag Reference   WBC COUNT 4 90 Thousand/uL  4 31-10 16   RBC COUNT 4 65 Million/uL  3 81-5 12   HEMOGLOBIN 13 7 g/dL  11 5-15 4   HEMATOCRIT 42 8 %  34 8-46  1   MCV 92 fL  82-98   MCH 29 5 pg  26 8-34 3   MCHC 32 0 g/dL  31 4-37 4   RDW 13 2 %  11 6-15 1   MPV 11 4 fL  8 9-12 7   PLATELET COUNT 955 Thousands/uL  149-390   nRBC AUTOMATED 0 /100 WBCs     NEUTROPHILS RELATIVE PERCENT 56 %  43-75   LYMPHOCYTES RELATIVE PERCENT 37 %  14-44   MONOCYTES RELATIVE PERCENT 7 %  4-12   EOSINOPHILS RELATIVE PERCENT 0 %  0-6   BASOPHILS RELATIVE PERCENT 0 %  0-1   NEUTROPHILS ABSOLUTE COUNT 2 72 Thousands/? ??L  1 85-7 62   LYMPHOCYTES ABSOLUTE COUNT 1 79 Thousands/? ??L  0 60-4 47   MONOCYTES ABSOLUTE COUNT 0 34 Thousand/? ??L  0 17-1 22   EOSINOPHILS ABSOLUTE COUNT 0 02 Thousand/? ??L  0 00-0 61   BASOPHILS ABSOLUTE COUNT 0 02 Thousands/? ??L  0 00-0 10

## 2018-01-22 VITALS
SYSTOLIC BLOOD PRESSURE: 114 MMHG | HEIGHT: 59 IN | WEIGHT: 81.13 LBS | BODY MASS INDEX: 16.36 KG/M2 | DIASTOLIC BLOOD PRESSURE: 80 MMHG | HEART RATE: 110 BPM

## 2018-01-25 ENCOUNTER — PROCEDURE VISIT (OUTPATIENT)
Dept: NEUROLOGY | Facility: CLINIC | Age: 83
End: 2018-01-25
Payer: MEDICARE

## 2018-01-25 DIAGNOSIS — R25.2 SPASTICITY: Primary | ICD-10-CM

## 2018-01-25 DIAGNOSIS — G24.3 SPASMODIC TORTICOLLIS: Primary | ICD-10-CM

## 2018-01-25 PROCEDURE — 95874 GUIDE NERV DESTR NEEDLE EMG: CPT | Performed by: PSYCHIATRY & NEUROLOGY

## 2018-01-25 PROCEDURE — 64616 CHEMODENERV MUSC NECK DYSTON: CPT | Performed by: PSYCHIATRY & NEUROLOGY

## 2018-01-25 NOTE — PROGRESS NOTES
Procedures     BOTOX PROCEDURE    Indication: Spasm  Three 100 unit vial's of Botox from lot number [id number] with an expiration June 2020 were diluted with 2 mL of normal saline to produce a 50 unit per cc dilution  Following aseptic technique a hollow bore 37 mm 27-gauge injectable EMG needle was utilized to identify various muscles in the cervical region  EMG demonstrated rapid firing normal-appearing motor potentials  The following muscles were infiltrated:    Right Splenius Capitis                     50 Units (2 Sites)  Right Trapezius                                 25 Units (2 Sites)  Left Trapezius                                    25 Units (2 Sites)  Left Sternocleidomastoid              100 Units (3 Sites)  Right Sternocleidomastoid            75 units (3 sites)    Total Botox Injected                       275 units  Total Botox wasted                          25 units    Any minimal bleeding was controlled with pressure  The patient tolerated the procedure well with no complications  There maintaining Botox was discarded as no appropriate Medicare patients were being done   Anticipate reinjection in 3 months

## 2018-01-26 ENCOUNTER — TRANSCRIBE ORDERS (OUTPATIENT)
Dept: NEUROLOGY | Facility: CLINIC | Age: 83
End: 2018-01-26

## 2018-02-09 ENCOUNTER — OFFICE VISIT (OUTPATIENT)
Dept: NEUROLOGY | Facility: CLINIC | Age: 83
End: 2018-02-09
Payer: MEDICARE

## 2018-02-09 VITALS
DIASTOLIC BLOOD PRESSURE: 102 MMHG | WEIGHT: 75 LBS | RESPIRATION RATE: 18 BRPM | SYSTOLIC BLOOD PRESSURE: 140 MMHG | BODY MASS INDEX: 15.74 KG/M2 | HEART RATE: 76 BPM | HEIGHT: 58 IN

## 2018-02-09 DIAGNOSIS — G24.3 TORTICOLLIS, SPASMODIC: Primary | ICD-10-CM

## 2018-02-09 PROCEDURE — 99212 OFFICE O/P EST SF 10 MIN: CPT | Performed by: PSYCHIATRY & NEUROLOGY

## 2018-02-09 RX ORDER — BIOTIN 1 MG
1 TABLET ORAL DAILY
COMMUNITY

## 2018-02-09 RX ORDER — AMLODIPINE BESYLATE 5 MG/1
1 TABLET ORAL DAILY PRN
COMMUNITY
End: 2019-02-18 | Stop reason: ALTCHOICE

## 2018-02-09 RX ORDER — LEVOTHYROXINE SODIUM 0.05 MG/1
1 TABLET ORAL
Refills: 0 | COMMUNITY
Start: 2018-02-01 | End: 2018-05-02 | Stop reason: SDUPTHER

## 2018-02-09 RX ORDER — GABAPENTIN 100 MG/1
1 CAPSULE ORAL 2 TIMES DAILY
COMMUNITY
Start: 2014-07-11 | End: 2018-05-02 | Stop reason: SDUPTHER

## 2018-02-09 NOTE — PROGRESS NOTES
Mana Hoang is a 80 y o  female here today following a Botox injection for Torticollis      Chief Complaint   Patient presents with    Torticollis       Assessment:  1  Torticollis, spasmodic        Plan:  Repeat Botox in early May with same protocol  Follow-up 2 weeks after    Discussion:  Jamie-Hill is symptoms of torticollis are under better control with Botox injections  She has no adverse effects from the medication  Will plan to continue current protocol    Subjective:    HPI   Jemal reports that she is getting over the flu  She feels that her recent Botox injection was very effective in controlling her head tremor  She denied any adverse effects following the injection, specifically no on unwanted weakness, speech, swallowing or breathing difficulties  She denies any other medical issues    Vitals:    02/09/18 1129   BP: (!) 140/102   BP Location: Right arm   Patient Position: Sitting   Cuff Size: Adult   Pulse: 76   Resp: 18   Weight: 34 kg (75 lb)   Height: 4' 10" (1 473 m)       Current Medications    Current Outpatient Prescriptions:     gabapentin (NEURONTIN) 100 mg capsule, Take 1 capsule by mouth 2 (two) times a day, Disp: , Rfl:     amLODIPine (NORVASC) 5 mg tablet, Take 1 tablet by mouth daily as needed, Disp: , Rfl:     Cholecalciferol (VITAMIN D3) 1000 units CAPS, Take 1 capsule by mouth daily, Disp: , Rfl:     levothyroxine 50 mcg tablet, Take 1 tablet by mouth daily in the early morning, Disp: , Rfl: 0      Allergies  Patient has no known allergies      Past Medical History  Past Medical History:   Diagnosis Date    Hypertension     Right foot drop     Torticollis          Past Surgical History:  Past Surgical History:   Procedure Laterality Date    TIBIA FRACTURE SURGERY Right 2000    TOTAL HIP ARTHROPLASTY Bilateral          Family History:  Family History   Problem Relation Age of Onset    Cancer Mother     No Known Problems Father        Social History:   reports that she has never smoked  She has never used smokeless tobacco  She reports that she does not drink alcohol  I have reviewed the past medical, social and family history, current medications, allergies, vitals, review of systems and updated this information as appropriate today     Objective:    Physical Exam    Neurological Exam  GENERAL:  Well-developed well-nourished woman in no acute distress  HEENT/NECK: Head is atraumatic normocephalic, neck is supple with some lateral Chris noted associated with head tremor  CARDIOVASCULAR:  No Carotid bruit  NEUROLOGIC:  Mental Status: Awake and alert without aphasia  Cranial Nerves: Extraocular movements are full  Face is symmetrical  Motor:  No drift is noted on arm extension  Coordination:  Finger-to-nose testing is performed accurately  She ambulates with a steppage gait pattern favoring the right side stable with a straight cane          ROS:  Review of Systems   Constitutional: Negative for fever  HENT: Negative for congestion, sinus pain and trouble swallowing  Eyes: Negative for pain  Respiratory: Negative for chest tightness and shortness of breath  Cardiovascular: Negative for chest pain  Gastrointestinal: Negative for abdominal pain, diarrhea and nausea  Musculoskeletal: Positive for gait problem  Negative for arthralgias, back pain and neck pain  Skin: Positive for rash  Neurological: Positive for tremors and weakness  Negative for dizziness and headaches  Psychiatric/Behavioral: Negative for sleep disturbance

## 2018-03-05 ENCOUNTER — OFFICE VISIT (OUTPATIENT)
Dept: DERMATOLOGY | Facility: CLINIC | Age: 83
End: 2018-03-05
Payer: MEDICARE

## 2018-03-05 DIAGNOSIS — L57.0 ACTINIC KERATOSIS: ICD-10-CM

## 2018-03-05 DIAGNOSIS — L98.9 UNKNOWN SKIN LESION: Primary | ICD-10-CM

## 2018-03-05 DIAGNOSIS — Z12.83 SCREENING FOR SKIN CANCER: ICD-10-CM

## 2018-03-05 PROCEDURE — 88305 TISSUE EXAM BY PATHOLOGIST: CPT | Performed by: DERMATOLOGY

## 2018-03-05 PROCEDURE — 88305 TISSUE EXAM BY PATHOLOGIST: CPT | Performed by: PATHOLOGY

## 2018-03-05 PROCEDURE — 11100 PR BIOPSY OF SKIN LESION: CPT | Performed by: DERMATOLOGY

## 2018-03-05 PROCEDURE — 99203 OFFICE O/P NEW LOW 30 MIN: CPT | Performed by: DERMATOLOGY

## 2018-03-05 RX ORDER — FLUOROURACIL 50 MG/G
CREAM TOPICAL 2 TIMES DAILY
Qty: 40 G | Refills: 0 | Status: SHIPPED | OUTPATIENT
Start: 2018-03-05 | End: 2018-08-20

## 2018-03-05 NOTE — PROGRESS NOTES
[address] [facility] [address] [facility] DERMATOLOGY  [address] [place] [address]     MRN: [id number] : 1934  Encounter: [id number]  Patient Information: Charles Proctor  Chief complaint:  Skin lesions on her nose    History of present illness:  59-year-old female without previous history of skin cancer presents because of concerns regarding numerous scaling areas on her nose that have been developing for awhile that seem to come and go  Patient with history of lots of sun exposure no recent changes no previous history of skin cancer not aware the lesion we noted on her right eyebrow  Past Medical History:   Diagnosis Date    Hypertension     Right foot drop     Torticollis      Past Surgical History:   Procedure Laterality Date    TIBIA FRACTURE SURGERY Right 2000    TOTAL HIP ARTHROPLASTY Bilateral      Social History   History   Alcohol Use No     History   Drug use: Unknown     History   Smoking Status    Never Smoker   Smokeless Tobacco    Never Used     Family History   Problem Relation Age of Onset    Cancer Mother     No Known Problems Father      Meds/Allergies   No Known Allergies    Meds:  Prior to Admission medications    Medication Sig Start Date End Date Taking?  Authorizing Provider   amLODIPine (NORVASC) 5 mg tablet Take 1 tablet by mouth daily as needed    Historical Provider, MD   Cholecalciferol (VITAMIN D3) 1000 units CAPS Take 1 capsule by mouth daily    Historical Provider, MD   gabapentin (NEURONTIN) 100 mg capsule Take 1 capsule by mouth 2 (two) times a day 14   Historical Provider, MD   levothyroxine 50 mcg tablet Take 1 tablet by mouth daily in the early morning 18   Historical Provider, MD       Subjective:     Review of Systems:    General: negative for - chills, fatigue, fever,  weight gain or weight loss  Psychological: negative for - anxiety, behavioral disorder, concentration difficulties, decreased libido, depression, irritability, memory difficulties, mood swings, sleep disturbances or suicidal ideation  ENT: negative for - hearing difficulties , nasal congestion, nasal discharge, oral lesions, sinus pain, sneezing, sore throat  Allergy and Immunology: negative for - hives, insect bite sensitivity,  Hematological and Lymphatic: negative for - bleeding problems, blood clots,bruising, swollen lymph nodes  Endocrine: negative for - hair pattern changes, hot flashes, malaise/lethargy, mood swings, palpitations, polydipsia/polyuria, skin changes, temperature intolerance or unexpected weight change  Respiratory: negative for - cough, hemoptysis, orthopnea, shortness of breath, or wheezing  Cardiovascular: negative for - chest pain, dyspnea on exertion, edema,  Gastrointestinal: negative for - abdominal pain, nausea/vomiting  Genito-Urinary: negative for - dysuria, incontinence, irregular/heavy menses or urinary frequency/urgency  Musculoskeletal: negative for - gait disturbance, joint pain, joint stiffness, joint swelling, muscle pain, muscular weakness  Dermatological:  As in HPI  Neurological: negative for confusion, dizziness, headaches, impaired coordination/balance, memory loss, numbness/tingling, seizures, speech problems, tremors or weakness       Objective: There were no vitals taken for this visit      Physical Exam:    General Appearance:    Alert, cooperative, no distress   Head:    Normocephalic, without obvious abnormality, atraumatic           Skin:   A full skin exam was performed including scalp, head scalp, eyes, ears, nose, lips, neck, chest, axilla, abdomen, back, buttocks, bilateral upper extremities, bilateral lower extremities, hands, feet, fingers, toes, fingernails, and toenails  skin 6 millimeter pearly papule noted on the right eyebrow numerous scaling hyperkeratotic areas noted on the nose normal keratotic papules with greasy stuck on appearance nothing else atypical noted on exam     Shave Biopsy Procedure Note    Pre-operative Diagnosis:  Basal cell carcinoma    Plan:  1  Instructed to keep the wound dry and covered for 24 and clean thereafter  2  Warning signs of infection were reviewed  3  Recommended that the patient use OTC acetaminophen as needed for pain  4  Return  Pending results of biopsy(ies)    Locations:   right eyebrow    Indications:  Rule out basal cell carcinoma    Anesthesia: Lidocaine 1% without epinephrine without added sodium bicarbonate    Procedure Details     Patient informed of the risks (including bleeding and infection) and benefits of the   procedure and Verbal informed consent obtained  The lesion and surrounding area were given a sterile prep using alcohol and draped in the usual sterile fashion  A Blue blade razor was used to obtain a specimen  Hemostasis achieved with aluminum chloride  Petrolatum and a sterile dressing applied  The specimen was sent for pathologic examination  The patient tolerated the procedure(s) well  Complications:  none  Assessment:     1  Unknown skin lesion     2  Actinic keratosis     3  Screening for skin cancer           Plan:   Skin lesion possible basal cell carcinoma await results of biopsy would require excision if positive   actinic keratosis quite diffusely present on the nose will go ahead and treat with 5% fluorouracil for per patient request re-evaluate in 3 weeks  Screening for Dermatologic Disorders: Nothing else of concern noted on complete exam follow up in 1 year     Shazia Medina MD  3/5/2018,4:05 PM    Portions of the record may have been created with voice recognition software   Occasional wrong word or "sound a like" substitutions may have occurred due to the inherent limitations of voice recognition software   Read the chart carefully and recognize, using context, where substitutions have occurred

## 2018-03-05 NOTE — PATIENT INSTRUCTIONS
Skin lesion possible basal cell carcinoma await results of biopsy would require excision if positive   actinic keratosis quite diffusely present on the nose will go ahead and treat with 5% fluorouracil for per patient request re-evaluate in 3 weeks  Screening for Dermatologic Disorders: Nothing else of concern noted on complete exam follow up in 1 year

## 2018-03-13 ENCOUNTER — TELEPHONE (OUTPATIENT)
Dept: INTERNAL MEDICINE CLINIC | Facility: CLINIC | Age: 83
End: 2018-03-13

## 2018-03-26 ENCOUNTER — OFFICE VISIT (OUTPATIENT)
Dept: DERMATOLOGY | Facility: CLINIC | Age: 83
End: 2018-03-26
Payer: MEDICARE

## 2018-03-26 DIAGNOSIS — L57.0 ACTINIC KERATOSIS: Primary | ICD-10-CM

## 2018-03-26 DIAGNOSIS — C44.319 BASAL CELL CARCINOMA OF EYEBROW: ICD-10-CM

## 2018-03-26 PROCEDURE — 99212 OFFICE O/P EST SF 10 MIN: CPT | Performed by: DERMATOLOGY

## 2018-03-26 NOTE — PATIENT INSTRUCTIONS
actinic keratosis patient did not treat the area him on for more than 2 weeks areas still crusted hopefully it will be sufficient to eradicate most of the precancerous  Lesions  Patient advised to use for petrolatum on the area to see if this will help to heal area   basal cell cancer right eyebrow again discussed that this is a skin cancer that only gets larger in the area does not typically spread can simply he can be excised and removed in the office within a 20 minutes

## 2018-03-26 NOTE — PROGRESS NOTES
[address] [facility] [address] [facility] DERMATOLOGY  [address] [place] [address]     MRN: [id number] : 1934  Encounter: [id number]  Patient Information: Chester Fajardo  Chief complaint: recheck for use of Efudex    History of present illness:  High 51-year-old female presents for follow-up for her numerous actinic keratosis on her nose as well as concerns regarding previously biopsied area on her right temple which she feels it has improved  Patient stop the 5% fluorouracil about 2 weeks after starting treatment because it caused too much irritation and she really did not understand the purpose of the medication  Past Medical History:   Diagnosis Date    Hypertension     Right foot drop     Torticollis      Past Surgical History:   Procedure Laterality Date    TIBIA FRACTURE SURGERY Right 2000    TOTAL HIP ARTHROPLASTY Bilateral      Social History   History   Alcohol Use No     History   Drug use: Unknown     History   Smoking Status    Never Smoker   Smokeless Tobacco    Never Used     Family History   Problem Relation Age of Onset    Cancer Mother     No Known Problems Father      Meds/Allergies   No Known Allergies    Meds:  Prior to Admission medications    Medication Sig Start Date End Date Taking?  Authorizing Provider   amLODIPine (NORVASC) 5 mg tablet Take 1 tablet by mouth daily as needed    Historical Provider, MD   Cholecalciferol (VITAMIN D3) 1000 units CAPS Take 1 capsule by mouth daily    Historical Provider, MD   fluorouracil (EFUDEX) 5 % cream Apply topically 2 (two) times a day 3/5/18   Den Camilo MD   gabapentin (NEURONTIN) 100 mg capsule Take 1 capsule by mouth 2 (two) times a day 14   Historical Provider, MD   levothyroxine 50 mcg tablet Take 1 tablet by mouth daily in the early morning 18   Historical Provider, MD       Subjective:     Review of Systems:    General: negative for - chills, fatigue, fever,  weight gain or weight loss  Psychological: negative for - anxiety, behavioral disorder, concentration difficulties, decreased libido, depression, irritability, memory difficulties, mood swings, sleep disturbances or suicidal ideation  ENT: negative for - hearing difficulties , nasal congestion, nasal discharge, oral lesions, sinus pain, sneezing, sore throat  Allergy and Immunology: negative for - hives, insect bite sensitivity,  Hematological and Lymphatic: negative for - bleeding problems, blood clots,bruising, swollen lymph nodes  Endocrine: negative for - hair pattern changes, hot flashes, malaise/lethargy, mood swings, palpitations, polydipsia/polyuria, skin changes, temperature intolerance or unexpected weight change  Respiratory: negative for - cough, hemoptysis, orthopnea, shortness of breath, or wheezing  Cardiovascular: negative for - chest pain, dyspnea on exertion, edema,  Gastrointestinal: negative for - abdominal pain, nausea/vomiting  Genito-Urinary: negative for - dysuria, incontinence, irregular/heavy menses or urinary frequency/urgency  Musculoskeletal: negative for - gait disturbance, joint pain, joint stiffness, joint swelling, muscle pain, muscular weakness  Dermatological:  As in HPI  Neurological: negative for confusion, dizziness, headaches, impaired coordination/balance, memory loss, numbness/tingling, seizures, speech problems, tremors or weakness       Objective: There were no vitals taken for this visit      Physical Exam:    General Appearance:    Alert, cooperative, no distress   Head:    Normocephalic, without obvious abnormality, atraumatic           Skin:   A full skin exam was performed including scalp, head scalp, eyes, ears, nose, lips, neck, chest, axilla, abdomen, back, buttocks, bilateral upper extremities, bilateral lower extremities, hands, feet, fingers, toes, fingernails, and toenails Slight crusting noted nares previous site of basal cell carcinoma on the right forehead     Assessment: 1  Actinic keratosis     2  Basal cell carcinoma of eyebrow           Plan:    actinic keratosis patient did not treat the area him on for more than 2 weeks areas still crusted hopefully it will be sufficient to eradicate most of the precancerous  Lesions  Patient advised to use for petrolatum on the area to see if this will help to heal area   basal cell cancer right eyebrow again discussed that this is a skin cancer that only gets larger in the area does not typically spread can simply he can be excised and removed in the office within a 20 minutes  Vic Amador MD  3/26/2018,3:48 PM    Portions of the record may have been created with voice recognition software   Occasional wrong word or "sound a like" substitutions may have occurred due to the inherent limitations of voice recognition software   Read the chart carefully and recognize, using context, where substitutions have occurred

## 2018-04-04 ENCOUNTER — PROCEDURE VISIT (OUTPATIENT)
Dept: DERMATOLOGY | Facility: CLINIC | Age: 83
End: 2018-04-04
Payer: MEDICARE

## 2018-04-04 DIAGNOSIS — C44.319 BASAL CELL CARCINOMA OF EYEBROW: Primary | ICD-10-CM

## 2018-04-04 PROCEDURE — 88305 TISSUE EXAM BY PATHOLOGIST: CPT | Performed by: PATHOLOGY

## 2018-04-04 PROCEDURE — 12052 INTMD RPR FACE/MM 2.6-5.0 CM: CPT | Performed by: DERMATOLOGY

## 2018-04-04 PROCEDURE — 11642 EXC F/E/E/N/L MAL+MRG 1.1-2: CPT | Performed by: DERMATOLOGY

## 2018-04-04 NOTE — PROGRESS NOTES
[id number] [facility] DERMATOLOGY  [address] [facility] [address]     MRN: [id number] : 1934  Encounter: [id number]  Patient Information: Gerardo Ray    Subjective:     80-year-old female presents for planned excision of basal cell carcinoma biopsied on the right eyebrow     Objective: There were no vitals taken for this visit  Physical Exam:    General Appearance:    Alert, cooperative, no distress   Skin:    Previously biopsy site is noted     Procedure name: Excision with intermediate layered closure        Location: right eyebrow    Diagnosis: Basal cell carcinoma    Size of lesion: 6 mm    Margins: 4 mm    Size + Margins: 14 mm    I explained the diagnosis to the patient and we recommend an excision of the lesion for diagnosis and/or treatment  Potential complications include, but are not limited to: scarring, bleeding, infection, incomplete removal, recurrence, and nerve damage  The risks, benefits, and alternatives were discussed with the patient in detail  The location of the tumor was identified, circled, and confirmed by the patient  The correct patient, site, and procedure were confirmed  Anesthesia: 1% xylocaine with 1:100,000 epinephrine 5 cc  The patient was brought into the room, prepped and draped sterilely in the usual manner and anesthesia was administered by local infiltration  A fusiform shape was drawn around the lesion, and the margins were incised to the level of the subcutaneous fat with a number 15cc Bard-Andrew blade  The tissue was removed with sharp and blunt dissection  The lateral margins of the resulting defect were undermined with sharp and blunt dissection  Hemostasis was achieved with electrocautery  The deeper layers of the defect, including subcutaneous fat and fascia, had to be approximated to reduce tension on the suture line  Layered wound closure was performed    The wound was cleaned with saline, dried off, petroleum applied, and the wound was covered with a pressure dressing  The patient was given detailed verbal and written instructions on postoperative care  Suture for adipose/fascial/dermal layer closure: 5-0  vicryl 2sutures interrupted    Suture used to approximate epidermis: 6-0  nylon 7 sutures interrupted    Final wound length: 3 5 cm    Follow-up in: 7 days  Patient tolerated procedure well without complications  Assessment:     1  Basal cell carcinoma of eyebrow           Plan:   Wound care instructions given to patient      Prior to Admission medications    Medication Sig Start Date End Date Taking? Authorizing Provider   Cholecalciferol (VITAMIN D3) 1000 units CAPS Take 1 capsule by mouth daily   Yes Historical Provider, MD   fluorouracil (EFUDEX) 5 % cream Apply topically 2 (two) times a day 3/5/18  Yes Danii Meek MD   gabapentin (NEURONTIN) 100 mg capsule Take 1 capsule by mouth 2 (two) times a day 7/11/14  Yes Historical Provider, MD   levothyroxine 50 mcg tablet Take 1 tablet by mouth daily in the early morning 2/1/18  Yes Historical Provider, MD   amLODIPine (NORVASC) 5 mg tablet Take 1 tablet by mouth daily as needed    Historical Provider, MD     No Known Allergies    Danii Meek MD  1/2/3565,8:04 PM    Portions of the record may have been created with voice recognition software   Occasional wrong word or "sound a like" substitutions may have occurred due to the inherent limitations of voice recognition software   Read the chart carefully and recognize, using context, where substitutions have occurred

## 2018-04-11 ENCOUNTER — CLINICAL SUPPORT (OUTPATIENT)
Dept: DERMATOLOGY | Facility: CLINIC | Age: 83
End: 2018-04-11

## 2018-04-11 DIAGNOSIS — C44.319 BASAL CELL CARCINOMA OF EYEBROW: Primary | ICD-10-CM

## 2018-04-11 PROCEDURE — 99024 POSTOP FOLLOW-UP VISIT: CPT | Performed by: DERMATOLOGY

## 2018-04-11 RX ORDER — ALPRAZOLAM 0.5 MG
TABLET ORAL
COMMUNITY
End: 2018-08-20

## 2018-04-11 RX ORDER — OXYCODONE HYDROCHLORIDE AND ACETAMINOPHEN 5; 325 MG/1; MG/1
TABLET ORAL
COMMUNITY
End: 2018-08-20

## 2018-04-11 NOTE — PROGRESS NOTES
[address] [facility] DERMATOLOGY  [address] [place] [address]     MRN: [id number] : 1934  Encounter: [id number]  Patient Information: Elliot Arroyo    Subjective:     45-year-old female presents for follow-up for previously excised basal cell carcinoma right forehead  No problems noted     Objective: There were no vitals taken for this visit  Physical Exam:    General Appearance:    Alert, cooperative, no distress   Skin:   Previous site of excision healing well   Procedure:  Suture removal  Site of excision:  Right eyebrow  Wound was cleansed with saline  Wound is intact  Sutures removed  Steri-Strips applied  Biopsy  Still pending  Assessment:     1  Basal cell carcinoma of eyebrow           Plan:   Wound care instructions given to patient follow-up in 6 months      Prior to Admission medications    Medication Sig Start Date End Date Taking?  Authorizing Provider   Cholecalciferol (VITAMIN D3) 1000 units CAPS Take 1 capsule by mouth daily   Yes Historical Provider, MD   fluorouracil (EFUDEX) 5 % cream Apply topically 2 (two) times a day 3/5/18  Yes Luke Levine MD   gabapentin (NEURONTIN) 100 mg capsule Take 1 capsule by mouth 2 (two) times a day 14  Yes Historical Provider, MD   levothyroxine 50 mcg tablet Take 1 tablet by mouth daily in the early morning 18  Yes Historical Provider, MD   ALPRAZolam Jeris Eaton) 0 5 mg tablet Take by mouth    Historical Provider, MD   amLODIPine (NORVASC) 5 mg tablet Take 1 tablet by mouth daily as needed    Historical Provider, MD   Cholecalciferol 1000 units tablet Take by mouth    Historical Provider, MD   oxyCODONE-acetaminophen (PERCOCET) 5-325 mg per tablet Take by mouth    Historical Provider, MD     No Known Allergies    Luke Levine MD  8701,5:96 PM    Portions of the record may have been created with voice recognition software   Occasional wrong word or "sound a like" substitutions may have occurred due to the inherent limitations of voice recognition software   Read the chart carefully and recognize, using context, where substitutions have occurred

## 2018-04-18 ENCOUNTER — TELEPHONE (OUTPATIENT)
Dept: DERMATOLOGY | Facility: CLINIC | Age: 83
End: 2018-04-18

## 2018-05-02 DIAGNOSIS — E03.9 ACQUIRED HYPOTHYROIDISM: Primary | ICD-10-CM

## 2018-05-02 DIAGNOSIS — G24.3 TORTICOLLIS, SPASMODIC: ICD-10-CM

## 2018-05-02 RX ORDER — GABAPENTIN 100 MG/1
CAPSULE ORAL
Qty: 60 CAPSULE | Refills: 5 | Status: SHIPPED | OUTPATIENT
Start: 2018-05-02 | End: 2018-10-01 | Stop reason: SDUPTHER

## 2018-05-02 RX ORDER — LEVOTHYROXINE SODIUM 0.05 MG/1
TABLET ORAL
Qty: 30 TABLET | Refills: 5 | Status: SHIPPED | OUTPATIENT
Start: 2018-05-02 | End: 2018-10-01 | Stop reason: SDUPTHER

## 2018-05-02 NOTE — TELEPHONE ENCOUNTER
Spoke to patient to let her know her medications were sent into the pharmacy on file and scheduled her for a follow up appoinment on 6/1/18

## 2018-05-03 ENCOUNTER — PROCEDURE VISIT (OUTPATIENT)
Dept: NEUROLOGY | Facility: CLINIC | Age: 83
End: 2018-05-03
Payer: MEDICARE

## 2018-05-03 VITALS
HEIGHT: 58 IN | HEART RATE: 88 BPM | DIASTOLIC BLOOD PRESSURE: 68 MMHG | SYSTOLIC BLOOD PRESSURE: 110 MMHG | TEMPERATURE: 99.2 F

## 2018-05-03 DIAGNOSIS — R25.2 SPASTICITY: ICD-10-CM

## 2018-05-03 PROCEDURE — 95874 GUIDE NERV DESTR NEEDLE EMG: CPT | Performed by: PSYCHIATRY & NEUROLOGY

## 2018-05-03 PROCEDURE — 64616 CHEMODENERV MUSC NECK DYSTON: CPT | Performed by: PSYCHIATRY & NEUROLOGY

## 2018-05-03 NOTE — PROGRESS NOTES
[facility]  Date/Time: 5/3/2018 12:50 PM  Performed by: Giuseppe Guteirrez  Authorized by: Giuseppe Gutierrez           BOTOX PROCEDURE    Indication: Spasm  Three 100 unit vial's of Botox each from lot number [id number] with an expiration November 2020 were diluted with 2 mL of normal saline to produce a 50 unit per cc dilution  Following aseptic technique a hollow bore 37 mm 27-gauge injectable EMG needle was utilized to identify various muscles in the cervical region  EMG demonstrated rapid firing normal-appearing motor potentials  The following muscles were infiltrated:    Right Splenius Capitis                     50 Units (2 Sites)  Right Trapezius                                 25 Units (2 Sites)  Left Trapezius                                    25 Units (2 Sites)  Left Sternocleidomastoid              100 Units (3 Sites)  Right Sternocleidomastoid            75 units (3 sites)    Total Botox Injected                       275 units  Total Botox wasted                         25 units    Any minimal bleeding was controlled with pressure  The patient tolerated the procedure well with no complications  There maintaining Botox was discarded as no appropriate Medicare patients were being done   Anticipate reinjection in 3 months

## 2018-05-16 ENCOUNTER — OFFICE VISIT (OUTPATIENT)
Dept: NEUROLOGY | Facility: CLINIC | Age: 83
End: 2018-05-16
Payer: MEDICARE

## 2018-05-16 VITALS
SYSTOLIC BLOOD PRESSURE: 112 MMHG | WEIGHT: 75.8 LBS | HEART RATE: 103 BPM | BODY MASS INDEX: 15.28 KG/M2 | HEIGHT: 59 IN | DIASTOLIC BLOOD PRESSURE: 78 MMHG

## 2018-05-16 DIAGNOSIS — G24.3 TORTICOLLIS, SPASMODIC: Primary | ICD-10-CM

## 2018-05-16 PROCEDURE — 99213 OFFICE O/P EST LOW 20 MIN: CPT | Performed by: PSYCHIATRY & NEUROLOGY

## 2018-05-16 NOTE — PROGRESS NOTES
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    Assessment:  1  Torticollis, spasmodic        Plan:  Repeat Botox in early August  Follow-up 2 weeks after    Discussion:  Antonietta Bumpers is doing well with Botox with respect to her spasmodic torticollis  Will continue with current regimen and I will see her back in follow-up 2 weeks after her next injection      Subjective:    HPI  Antonietta Bumpers reports that since her last injection she has been doing well  She feels her tremor in her head is much less and does not pull to the side  She does have some tremor in her head  She denies any adverse effects from the injection, specifically no problems with speech swallowing or breathing  She denies any new health issues  She recently had a lesion removed from her head and she was told it was benign      Past Medical History:   Diagnosis Date    Hypertension     Right foot drop     Torticollis        Family History:  Family History   Problem Relation Age of Onset    Cancer Mother     Heart attack Father      as per [employer]        Past Surgical History:  Past Surgical History:   Procedure Laterality Date    TIBIA FRACTURE SURGERY Right 2000    TOTAL HIP ARTHROPLASTY Bilateral        Social History:   reports that she has never smoked  She has never used smokeless tobacco  She reports that she does not drink alcohol or use drugs  Allergies:  Patient has no known allergies        Current Outpatient Prescriptions:     amLODIPine (NORVASC) 5 mg tablet, Take 1 tablet by mouth daily as needed, Disp: , Rfl:     Cholecalciferol (VITAMIN D3) 1000 units CAPS, Take 1 capsule by mouth daily, Disp: , Rfl:     gabapentin (NEURONTIN) 100 mg capsule, take 1 capsule by mouth twice a day, Disp: 60 capsule, Rfl: 5    levothyroxine 50 mcg tablet, take 1 tablet by mouth once daily, Disp: 30 tablet, Rfl: 5    ALPRAZolam (XANAX) 0 5 mg tablet, Take by mouth, Disp: , Rfl:     fluorouracil (EFUDEX) 5 % cream, Apply topically 2 (two) times a day, Disp: 40 g, Rfl: 0    oxyCODONE-acetaminophen (PERCOCET) 5-325 mg per tablet, Take by mouth, Disp: , Rfl:     I have reviewed the past medical, social and family history, current medications, allergies, vitals, review of systems and updated this information as appropriate today     Objective:    Vitals:  Blood pressure 112/78, pulse 103, height 4' 11" (1 499 m), weight 34 4 kg (75 lb 12 8 oz)  Physical Exam    Neurological Exam  GENERAL:  Well-developed well-nourished woman in no acute distress  HEENT/NECK: Head is atraumatic normocephalic, neck is supple  To straight there is mild titubation of the head which she is able to control by placing a finger near her chin  CARDIOVASCULAR:  No Carotid bruit  NEUROLOGIC:  Mental Status: Awake and alert without aphasia  Cranial Nerves: Extraocular movements are full  Face is symmetrical  Motor:  No drift is noted on arm extension  Coordination:  Finger-to-nose testing is performed accurately  She ambulates with a steppage gait pattern on the right utilizing a straight cane            ROS:    Review of Systems   Constitutional: Negative for chills, fatigue and fever  HENT: Negative for congestion, postnasal drip, sore throat, tinnitus and trouble swallowing  Eyes: Negative for pain, discharge and visual disturbance  Respiratory: Negative for cough, shortness of breath and wheezing  Cardiovascular: Negative  Gastrointestinal: Negative for abdominal pain, diarrhea, nausea and vomiting  Endocrine: Negative for cold intolerance and heat intolerance  Genitourinary: Negative for difficulty urinating, frequency, hematuria and urgency  Musculoskeletal: Negative for arthralgias, back pain, gait problem, neck pain and neck stiffness  Skin: Negative for rash and wound  Allergic/Immunologic: Negative for environmental allergies and food allergies  Neurological: Negative  Hematological: Negative  Psychiatric/Behavioral: Negative for confusion, decreased concentration and sleep disturbance

## 2018-06-01 ENCOUNTER — OFFICE VISIT (OUTPATIENT)
Dept: INTERNAL MEDICINE CLINIC | Facility: CLINIC | Age: 83
End: 2018-06-01
Payer: MEDICARE

## 2018-06-01 VITALS
HEIGHT: 59 IN | SYSTOLIC BLOOD PRESSURE: 140 MMHG | TEMPERATURE: 97.7 F | DIASTOLIC BLOOD PRESSURE: 90 MMHG | BODY MASS INDEX: 14.6 KG/M2 | WEIGHT: 72.4 LBS | OXYGEN SATURATION: 95 % | RESPIRATION RATE: 18 BRPM | HEART RATE: 123 BPM

## 2018-06-01 DIAGNOSIS — G24.3 TORTICOLLIS, SPASMODIC: ICD-10-CM

## 2018-06-01 DIAGNOSIS — E03.9 ACQUIRED HYPOTHYROIDISM: Chronic | ICD-10-CM

## 2018-06-01 DIAGNOSIS — I10 BENIGN HYPERTENSION: Primary | Chronic | ICD-10-CM

## 2018-06-01 PROCEDURE — 99214 OFFICE O/P EST MOD 30 MIN: CPT | Performed by: INTERNAL MEDICINE

## 2018-06-01 NOTE — PROGRESS NOTES
Assessment/Plan:      Chronic problems are stable  Continue present medications  Continue diet and exercise  Ordered labs for next visit  Followup scheduled per orders  No problem-specific Assessment & Plan notes found for this encounter  Diagnoses and all orders for this visit:    Benign hypertension  -     CBC and differential; Future  -     Comprehensive metabolic panel; Future    Acquired hypothyroidism  -     T4, free; Future  -     TSH, 3rd generation; Future    Torticollis, spasmodic          Subjective:      Patient ID: Margaret Costa is a 80 y o  female  Patient comes in today for routine follow-up  She states that things are about the same  She is taking her blood pressure medicine  Slightly high today but she is upset because 1 of her younger family members  from a drug overdose  Her thyroid levels have been controlled but she is due for blood work  She still follows with Neurology and receives injections for her torticollis  The lesion on her eyebrow was a basal cell  That was fully removed  Otherwise, she denies any new complaints          ALLERGIES:  No Known Allergies    CURRENT MEDICATIONS:    Current Outpatient Prescriptions:     amLODIPine (NORVASC) 5 mg tablet, Take 1 tablet by mouth daily as needed, Disp: , Rfl:     Cholecalciferol (VITAMIN D3) 1000 units CAPS, Take 1 capsule by mouth daily, Disp: , Rfl:     gabapentin (NEURONTIN) 100 mg capsule, take 1 capsule by mouth twice a day, Disp: 60 capsule, Rfl: 5    levothyroxine 50 mcg tablet, take 1 tablet by mouth once daily, Disp: 30 tablet, Rfl: 5    ALPRAZolam (XANAX) 0 5 mg tablet, Take by mouth, Disp: , Rfl:     fluorouracil (EFUDEX) 5 % cream, Apply topically 2 (two) times a day, Disp: 40 g, Rfl: 0    oxyCODONE-acetaminophen (PERCOCET) 5-325 mg per tablet, Take by mouth, Disp: , Rfl:     ACTIVE PROBLEM LIST:  Patient Active Problem List   Diagnosis    Torticollis, spasmodic    Unknown skin lesion    Actinic keratosis    Screening for skin cancer    Basal cell carcinoma of eyebrow    Benign hypertension    Hypothyroidism    Osteoporosis       PAST MEDICAL HISTORY:  Past Medical History:   Diagnosis Date    Hypertension     Right foot drop     Torticollis        PAST SURGICAL HISTORY:  Past Surgical History:   Procedure Laterality Date    TIBIA FRACTURE SURGERY Right 2000    TOTAL HIP ARTHROPLASTY Bilateral        FAMILY HISTORY:  Family History   Problem Relation Age of Onset    Cancer Mother     Heart attack Father      as per [employer]        SOCIAL HISTORY:  Social History     Social History    Marital status:      Spouse name: N/A    Number of children: N/A    Years of education: N/A     Occupational History    retired      Social History Main Topics    Smoking status: Never Smoker    Smokeless tobacco: Never Used      Comment: former smoker as per [employer]    Alcohol use No      Comment: occasional use as per [employer]    Drug use: No    Sexual activity: Not on file     Other Topics Concern    Not on file     Social History Narrative    Caffeine use       Review of Systems   Respiratory: Negative for shortness of breath  Cardiovascular: Negative for chest pain  Gastrointestinal: Negative for abdominal pain  Objective:  Vitals:    06/01/18 1359   BP: 140/90   BP Location: Right arm   Patient Position: Sitting   Cuff Size: Adult   Pulse: (!) 123   Resp: 18   Temp: 97 7 °F (36 5 °C)   TempSrc: Tympanic   SpO2: 95%   Weight: 32 8 kg (72 lb 6 4 oz)   Height: 4' 11" (1 499 m)        Physical Exam   Constitutional: She is oriented to person, place, and time  She appears well-developed and well-nourished  Cardiovascular: Normal rate, regular rhythm and normal heart sounds  Pulmonary/Chest: Effort normal and breath sounds normal    Abdominal: Soft  There is no tenderness  Neurological: She is alert and oriented to person, place, and time     Nursing note and vitals reviewed  RESULTS:    No results found for this or any previous visit (from the past 1008 hour(s))  This note was created with voice recognition software  Phonic, grammatical and spelling errors may be present within the note as a result

## 2018-06-05 ENCOUNTER — APPOINTMENT (OUTPATIENT)
Dept: LAB | Facility: CLINIC | Age: 83
End: 2018-06-05
Payer: MEDICARE

## 2018-06-05 DIAGNOSIS — I10 BENIGN HYPERTENSION: Chronic | ICD-10-CM

## 2018-06-05 DIAGNOSIS — E03.9 ACQUIRED HYPOTHYROIDISM: Chronic | ICD-10-CM

## 2018-06-05 LAB
ALBUMIN SERPL BCP-MCNC: 3.6 G/DL (ref 3.5–5)
ALP SERPL-CCNC: 126 U/L (ref 46–116)
ALT SERPL W P-5'-P-CCNC: 16 U/L (ref 12–78)
ANION GAP SERPL CALCULATED.3IONS-SCNC: 5 MMOL/L (ref 4–13)
AST SERPL W P-5'-P-CCNC: 19 U/L (ref 5–45)
BASOPHILS # BLD AUTO: 0.05 THOUSANDS/ΜL (ref 0–0.1)
BASOPHILS NFR BLD AUTO: 1 % (ref 0–1)
BILIRUB SERPL-MCNC: 0.45 MG/DL (ref 0.2–1)
BUN SERPL-MCNC: 14 MG/DL (ref 5–25)
CALCIUM SERPL-MCNC: 9.6 MG/DL (ref 8.3–10.1)
CHLORIDE SERPL-SCNC: 100 MMOL/L (ref 100–108)
CO2 SERPL-SCNC: 32 MMOL/L (ref 21–32)
CREAT SERPL-MCNC: 0.72 MG/DL (ref 0.6–1.3)
EOSINOPHIL # BLD AUTO: 0.04 THOUSAND/ΜL (ref 0–0.61)
EOSINOPHIL NFR BLD AUTO: 1 % (ref 0–6)
ERYTHROCYTE [DISTWIDTH] IN BLOOD BY AUTOMATED COUNT: 13.2 % (ref 11.6–15.1)
GFR SERPL CREATININE-BSD FRML MDRD: 78 ML/MIN/1.73SQ M
GLUCOSE SERPL-MCNC: 91 MG/DL (ref 65–140)
HCT VFR BLD AUTO: 45.4 % (ref 34.8–46.1)
HGB BLD-MCNC: 14 G/DL (ref 11.5–15.4)
IMM GRANULOCYTES # BLD AUTO: 0.01 THOUSAND/UL (ref 0–0.2)
IMM GRANULOCYTES NFR BLD AUTO: 0 % (ref 0–2)
LYMPHOCYTES # BLD AUTO: 2.56 THOUSANDS/ΜL (ref 0.6–4.47)
LYMPHOCYTES NFR BLD AUTO: 42 % (ref 14–44)
MCH RBC QN AUTO: 28.5 PG (ref 26.8–34.3)
MCHC RBC AUTO-ENTMCNC: 30.8 G/DL (ref 31.4–37.4)
MCV RBC AUTO: 92 FL (ref 82–98)
MONOCYTES # BLD AUTO: 0.46 THOUSAND/ΜL (ref 0.17–1.22)
MONOCYTES NFR BLD AUTO: 8 % (ref 4–12)
NEUTROPHILS # BLD AUTO: 3.02 THOUSANDS/ΜL (ref 1.85–7.62)
NEUTS SEG NFR BLD AUTO: 48 % (ref 43–75)
NRBC BLD AUTO-RTO: 0 /100 WBCS
PLATELET # BLD AUTO: 334 THOUSANDS/UL (ref 149–390)
PMV BLD AUTO: 11.2 FL (ref 8.9–12.7)
POTASSIUM SERPL-SCNC: 3.8 MMOL/L (ref 3.5–5.3)
PROT SERPL-MCNC: 8.8 G/DL (ref 6.4–8.2)
RBC # BLD AUTO: 4.92 MILLION/UL (ref 3.81–5.12)
SODIUM SERPL-SCNC: 137 MMOL/L (ref 136–145)
T4 FREE SERPL-MCNC: 1.15 NG/DL (ref 0.76–1.46)
TSH SERPL DL<=0.05 MIU/L-ACNC: 4.01 UIU/ML (ref 0.36–3.74)
WBC # BLD AUTO: 6.14 THOUSAND/UL (ref 4.31–10.16)

## 2018-06-05 PROCEDURE — 84443 ASSAY THYROID STIM HORMONE: CPT

## 2018-06-05 PROCEDURE — 36415 COLL VENOUS BLD VENIPUNCTURE: CPT

## 2018-06-05 PROCEDURE — 80053 COMPREHEN METABOLIC PANEL: CPT

## 2018-06-05 PROCEDURE — 85025 COMPLETE CBC W/AUTO DIFF WBC: CPT

## 2018-06-05 PROCEDURE — 84439 ASSAY OF FREE THYROXINE: CPT

## 2018-07-11 ENCOUNTER — TELEPHONE (OUTPATIENT)
Dept: NEUROLOGY | Facility: CLINIC | Age: 83
End: 2018-07-11

## 2018-08-02 ENCOUNTER — PROCEDURE VISIT (OUTPATIENT)
Dept: NEUROLOGY | Facility: CLINIC | Age: 83
End: 2018-08-02
Payer: MEDICARE

## 2018-08-02 VITALS — SYSTOLIC BLOOD PRESSURE: 130 MMHG | DIASTOLIC BLOOD PRESSURE: 92 MMHG | TEMPERATURE: 96 F

## 2018-08-02 DIAGNOSIS — G24.3 SPASMODIC TORTICOLLIS: Primary | ICD-10-CM

## 2018-08-02 PROCEDURE — 64616 CHEMODENERV MUSC NECK DYSTON: CPT | Performed by: PSYCHIATRY & NEUROLOGY

## 2018-08-02 PROCEDURE — 95874 GUIDE NERV DESTR NEEDLE EMG: CPT | Performed by: PSYCHIATRY & NEUROLOGY

## 2018-08-02 NOTE — PROGRESS NOTES
Chemodenervation  Date/Time: 8/2/2018 1:02 PM  Performed by: Edward Vidal  Authorized by: Edward Vidal           BOTOX PROCEDURE    Indication:  Torticollis    Three 100 unit vial's of Botox each from lot number  [id number] with an expiration March 2021 were diluted with 2 mL of normal saline to produce a 50 unit per cc dilution  Following aseptic technique a hollow bore 37 mm 27-gauge injectable EMG needle was utilized to identify various muscles in the cervical region  EMG demonstrated rapid firing normal-appearing motor potentials   The following muscles were infiltrated:    Right Splenius Capitis                     50 Units (2 Sites)  Right Trapezius                               25 Units (2 Sites)  Left Trapezius                                 25 Units (2 Sites)  Left Sternocleidomastoid              100 Units (3 Sites)  Right Sternocleidomastoid            75 units (3 sites)    Total Botox Injected                       275 units

## 2018-08-20 ENCOUNTER — OFFICE VISIT (OUTPATIENT)
Dept: NEUROLOGY | Facility: CLINIC | Age: 83
End: 2018-08-20
Payer: MEDICARE

## 2018-08-20 VITALS
BODY MASS INDEX: 15.32 KG/M2 | DIASTOLIC BLOOD PRESSURE: 92 MMHG | WEIGHT: 76 LBS | HEIGHT: 59 IN | SYSTOLIC BLOOD PRESSURE: 142 MMHG | HEART RATE: 92 BPM

## 2018-08-20 DIAGNOSIS — G24.3 TORTICOLLIS, SPASMODIC: Primary | ICD-10-CM

## 2018-08-20 PROCEDURE — 99213 OFFICE O/P EST LOW 20 MIN: CPT | Performed by: PSYCHIATRY & NEUROLOGY

## 2018-08-20 NOTE — PROGRESS NOTES
Irasema Etienne is a 80 y o  female who returns in follow-up today with history of torticollis, status post recent Botox injection    Assessment:  1  Torticollis, spasmodic        Plan:  Repeat Botox in early November  Follow-up 2 weeks after    Discussion:  Farhad Molbey reports good control of her head tremor/torticollis with Botox  She has had no adverse effects  Will continue these every 3 months and I will see her back in follow-up afterwards      Subjective:    HPI  Farhad Mobley reports good results following recent Botox injection  She states for the most part her head does not shake too much, only when she is anxious  She denies any adverse effects from the injection, specifically no problems with speech swallowing or breathing  She denies any other health issues      Past Medical History:   Diagnosis Date    Hypertension     Right foot drop     Torticollis        Family History:  Family History   Problem Relation Age of Onset    Cancer Mother     Heart attack Father         as per [employer]        Past Surgical History:  Past Surgical History:   Procedure Laterality Date    TIBIA FRACTURE SURGERY Right 2000    TOTAL HIP ARTHROPLASTY Bilateral        Social History:   reports that she has never smoked  She has never used smokeless tobacco  She reports that she does not drink alcohol or use drugs      Allergies:  Metoprolol      Current Outpatient Prescriptions:     Cholecalciferol (VITAMIN D3) 1000 units CAPS, Take 1 capsule by mouth daily, Disp: , Rfl:     gabapentin (NEURONTIN) 100 mg capsule, take 1 capsule by mouth twice a day, Disp: 60 capsule, Rfl: 5    levothyroxine 50 mcg tablet, take 1 tablet by mouth once daily, Disp: 30 tablet, Rfl: 5    amLODIPine (NORVASC) 5 mg tablet, Take 1 tablet by mouth daily as needed, Disp: , Rfl:     I have reviewed the past medical, social and family history, current medications, allergies, vitals, review of systems and updated this information as appropriate today Objective:    Vitals:  Blood pressure 142/92, pulse 92, height 4' 11" (1 499 m), weight 34 5 kg (76 lb)  Physical Exam    Neurological Exam  GENERAL:  Well-developed well-nourished woman in no acute distress  HEENT/NECK: Head is atraumatic normocephalic, neck is supple  Mild side-to-side head tremors noted with subtle pulling to the left  CARDIOVASCULAR:  No Carotid bruit  NEUROLOGIC:  Mental Status: Awake and alert without aphasia  Cranial Nerves: Extraocular movements are full  Face is symmetrical  Motor:   No drift is noted on arm extension   Coordination:  On finger-to-nose testing Mild end tremor is noted gait is stable with a straight cane              ROS:    Review of Systems   Constitutional: Negative  Negative for appetite change and fever  HENT: Negative  Negative for hearing loss, tinnitus, trouble swallowing and voice change  Eyes: Negative  Negative for photophobia, pain and visual disturbance  Respiratory: Negative  Negative for shortness of breath  Cardiovascular: Negative  Negative for palpitations  Gastrointestinal: Negative  Negative for nausea and vomiting  Endocrine: Negative  Negative for cold intolerance and heat intolerance  Genitourinary: Negative  Negative for dysuria, frequency and urgency  Musculoskeletal: Positive for gait problem  Negative for myalgias and neck pain  Skin: Negative  Negative for rash  Neurological: Positive for tremors  Negative for dizziness, seizures, syncope, facial asymmetry, speech difficulty, weakness, light-headedness, numbness and headaches  Hematological: Negative  Does not bruise/bleed easily  Psychiatric/Behavioral: Positive for sleep disturbance  Negative for confusion and hallucinations

## 2018-10-01 DIAGNOSIS — G24.3 TORTICOLLIS, SPASMODIC: ICD-10-CM

## 2018-10-01 DIAGNOSIS — E03.9 ACQUIRED HYPOTHYROIDISM: ICD-10-CM

## 2018-10-01 RX ORDER — GABAPENTIN 100 MG/1
100 CAPSULE ORAL 2 TIMES DAILY
Qty: 60 CAPSULE | Refills: 5 | Status: SHIPPED | OUTPATIENT
Start: 2018-10-01 | End: 2018-10-23 | Stop reason: SDUPTHER

## 2018-10-01 RX ORDER — LEVOTHYROXINE SODIUM 0.05 MG/1
50 TABLET ORAL DAILY
Qty: 30 TABLET | Refills: 5 | Status: SHIPPED | OUTPATIENT
Start: 2018-10-01 | End: 2018-10-23 | Stop reason: SDUPTHER

## 2018-10-23 ENCOUNTER — OFFICE VISIT (OUTPATIENT)
Dept: INTERNAL MEDICINE CLINIC | Facility: CLINIC | Age: 83
End: 2018-10-23
Payer: MEDICARE

## 2018-10-23 ENCOUNTER — APPOINTMENT (OUTPATIENT)
Dept: LAB | Facility: HOSPITAL | Age: 83
End: 2018-10-23
Attending: INTERNAL MEDICINE
Payer: MEDICARE

## 2018-10-23 VITALS
WEIGHT: 76 LBS | BODY MASS INDEX: 15.32 KG/M2 | SYSTOLIC BLOOD PRESSURE: 128 MMHG | HEIGHT: 59 IN | DIASTOLIC BLOOD PRESSURE: 74 MMHG | HEART RATE: 133 BPM | OXYGEN SATURATION: 98 %

## 2018-10-23 DIAGNOSIS — Z23 NEED FOR INFLUENZA VACCINATION: ICD-10-CM

## 2018-10-23 DIAGNOSIS — F41.9 ANXIETY: ICD-10-CM

## 2018-10-23 DIAGNOSIS — G24.3 TORTICOLLIS, SPASMODIC: ICD-10-CM

## 2018-10-23 DIAGNOSIS — E03.9 ACQUIRED HYPOTHYROIDISM: ICD-10-CM

## 2018-10-23 DIAGNOSIS — I10 BENIGN HYPERTENSION: Chronic | ICD-10-CM

## 2018-10-23 DIAGNOSIS — E03.9 ACQUIRED HYPOTHYROIDISM: Primary | ICD-10-CM

## 2018-10-23 LAB
ALBUMIN SERPL BCP-MCNC: 3.6 G/DL (ref 3.5–5)
ALP SERPL-CCNC: 139 U/L (ref 46–116)
ALT SERPL W P-5'-P-CCNC: 16 U/L (ref 12–78)
ANION GAP SERPL CALCULATED.3IONS-SCNC: 9 MMOL/L (ref 4–13)
AST SERPL W P-5'-P-CCNC: 20 U/L (ref 5–45)
BASOPHILS # BLD AUTO: 0.04 THOUSANDS/ΜL (ref 0–0.1)
BASOPHILS NFR BLD AUTO: 1 % (ref 0–1)
BILIRUB SERPL-MCNC: 0.4 MG/DL (ref 0.2–1)
BUN SERPL-MCNC: 19 MG/DL (ref 5–25)
CALCIUM SERPL-MCNC: 9.7 MG/DL (ref 8.3–10.1)
CHLORIDE SERPL-SCNC: 100 MMOL/L (ref 100–108)
CO2 SERPL-SCNC: 32 MMOL/L (ref 21–32)
CREAT SERPL-MCNC: 0.81 MG/DL (ref 0.6–1.3)
EOSINOPHIL # BLD AUTO: 0.04 THOUSAND/ΜL (ref 0–0.61)
EOSINOPHIL NFR BLD AUTO: 1 % (ref 0–6)
ERYTHROCYTE [DISTWIDTH] IN BLOOD BY AUTOMATED COUNT: 13.1 % (ref 11.6–15.1)
GFR SERPL CREATININE-BSD FRML MDRD: 67 ML/MIN/1.73SQ M
GLUCOSE SERPL-MCNC: 109 MG/DL (ref 65–140)
HCT VFR BLD AUTO: 44.4 % (ref 34.8–46.1)
HGB BLD-MCNC: 14.3 G/DL (ref 11.5–15.4)
IMM GRANULOCYTES # BLD AUTO: 0.01 THOUSAND/UL (ref 0–0.2)
IMM GRANULOCYTES NFR BLD AUTO: 0 % (ref 0–2)
LYMPHOCYTES # BLD AUTO: 1.85 THOUSANDS/ΜL (ref 0.6–4.47)
LYMPHOCYTES NFR BLD AUTO: 25 % (ref 14–44)
MCH RBC QN AUTO: 28.9 PG (ref 26.8–34.3)
MCHC RBC AUTO-ENTMCNC: 32.2 G/DL (ref 31.4–37.4)
MCV RBC AUTO: 90 FL (ref 82–98)
MONOCYTES # BLD AUTO: 0.39 THOUSAND/ΜL (ref 0.17–1.22)
MONOCYTES NFR BLD AUTO: 5 % (ref 4–12)
NEUTROPHILS # BLD AUTO: 5.06 THOUSANDS/ΜL (ref 1.85–7.62)
NEUTS SEG NFR BLD AUTO: 68 % (ref 43–75)
NRBC BLD AUTO-RTO: 0 /100 WBCS
PLATELET # BLD AUTO: 324 THOUSANDS/UL (ref 149–390)
PMV BLD AUTO: 10.1 FL (ref 8.9–12.7)
POTASSIUM SERPL-SCNC: 4.2 MMOL/L (ref 3.5–5.3)
PROT SERPL-MCNC: 9.1 G/DL (ref 6.4–8.2)
RBC # BLD AUTO: 4.94 MILLION/UL (ref 3.81–5.12)
SODIUM SERPL-SCNC: 141 MMOL/L (ref 136–145)
T4 FREE SERPL-MCNC: 1.18 NG/DL (ref 0.76–1.46)
TSH SERPL DL<=0.05 MIU/L-ACNC: 4.5 UIU/ML (ref 0.36–3.74)
WBC # BLD AUTO: 7.39 THOUSAND/UL (ref 4.31–10.16)

## 2018-10-23 PROCEDURE — 84443 ASSAY THYROID STIM HORMONE: CPT

## 2018-10-23 PROCEDURE — 99214 OFFICE O/P EST MOD 30 MIN: CPT | Performed by: INTERNAL MEDICINE

## 2018-10-23 PROCEDURE — 80053 COMPREHEN METABOLIC PANEL: CPT

## 2018-10-23 PROCEDURE — 36415 COLL VENOUS BLD VENIPUNCTURE: CPT

## 2018-10-23 PROCEDURE — 85025 COMPLETE CBC W/AUTO DIFF WBC: CPT

## 2018-10-23 PROCEDURE — 84439 ASSAY OF FREE THYROXINE: CPT

## 2018-10-23 RX ORDER — LEVOTHYROXINE SODIUM 0.05 MG/1
50 TABLET ORAL DAILY
Qty: 30 TABLET | Refills: 0 | Status: SHIPPED | OUTPATIENT
Start: 2018-10-23 | End: 2018-11-27 | Stop reason: SDUPTHER

## 2018-10-23 RX ORDER — GABAPENTIN 100 MG/1
100 CAPSULE ORAL 2 TIMES DAILY
Qty: 60 CAPSULE | Refills: 0 | Status: SHIPPED | OUTPATIENT
Start: 2018-10-23 | End: 2018-11-27 | Stop reason: SDUPTHER

## 2018-10-23 RX ORDER — ESCITALOPRAM OXALATE 5 MG/1
5 TABLET ORAL DAILY
Qty: 30 TABLET | Refills: 3 | Status: SHIPPED | OUTPATIENT
Start: 2018-10-23 | End: 2019-05-13 | Stop reason: ALTCHOICE

## 2018-10-23 NOTE — PROGRESS NOTES
Assessment/Plan: At this point, her anxiety appears to be her biggest problem  Her heart rate did slow down but she easily gets anxious  Her daughter and I convinced her to try low-dose Lexapro  She may even start with a half tablet  Will check labs to make sure nothing else is going on  Return in about 2 weeks (around 11/6/2018)  No problem-specific Assessment & Plan notes found for this encounter  Diagnoses and all orders for this visit:    Acquired hypothyroidism  -     levothyroxine 50 mcg tablet; Take 1 tablet (50 mcg total) by mouth daily  -     CBC and differential; Future  -     Comprehensive metabolic panel; Future  -     TSH, 3rd generation; Future  -     T4, free; Future    Torticollis, spasmodic  -     gabapentin (NEURONTIN) 100 mg capsule; Take 1 capsule (100 mg total) by mouth 2 (two) times a day    Benign hypertension    Anxiety  -     escitalopram (LEXAPRO) 5 mg tablet; Take 1 tablet (5 mg total) by mouth daily    Need for influenza vaccination  -     influenza vaccine, [id number], high-dose, PF 0 5 mL, for patients 65 yr+ (FLUZONE HIGH-DOSE)          Subjective:      Patient ID: Chey Pino is a 80 y o  female  Patient comes in today for follow-up  She comes with her daughter  She states she does not feel well  Could not be specific other than she is a wreck  Her anxiety is getting to her again  Worries about everything  Over thinks everything  Her leg pain is better with the gabapentin but she worries that she is taking the gabapentin  Her blood pressure is controlled  Her thyroid levels have been controlled  She states she is taking her medicine as directed  She has reported no fevers  No chest pain  No palpitations  Her daughter notes that when they took her out to [facility] and she was distracted, she was quite calm  Her tremors were not visible          ALLERGIES:  Allergies   Allergen Reactions    Metoprolol Nausea Only       CURRENT MEDICATIONS:    Current Outpatient Prescriptions:     amLODIPine (NORVASC) 5 mg tablet, Take 1 tablet by mouth daily as needed, Disp: , Rfl:     Cholecalciferol (VITAMIN D3) 1000 units CAPS, Take 1 capsule by mouth daily, Disp: , Rfl:     gabapentin (NEURONTIN) 100 mg capsule, Take 1 capsule (100 mg total) by mouth 2 (two) times a day, Disp: 60 capsule, Rfl: 0    levothyroxine 50 mcg tablet, Take 1 tablet (50 mcg total) by mouth daily, Disp: 30 tablet, Rfl: 0    escitalopram (LEXAPRO) 5 mg tablet, Take 1 tablet (5 mg total) by mouth daily, Disp: 30 tablet, Rfl: 3    ACTIVE PROBLEM LIST:  Patient Active Problem List   Diagnosis    Torticollis, spasmodic    Unknown skin lesion    Actinic keratosis    Screening for skin cancer    Basal cell carcinoma of eyebrow    Benign hypertension    Hypothyroidism    Osteoporosis    Anxiety       PAST MEDICAL HISTORY:  Past Medical History:   Diagnosis Date    Hypertension     Right foot drop     Torticollis        PAST SURGICAL HISTORY:  Past Surgical History:   Procedure Laterality Date    TIBIA FRACTURE SURGERY Right 2000    TOTAL HIP ARTHROPLASTY Bilateral        FAMILY HISTORY:  Family History   Problem Relation Age of Onset    Cancer Mother     Heart attack Father         as per [employer]        SOCIAL HISTORY:  Social History     Social History    Marital status:      Spouse name: N/A    Number of children: N/A    Years of education: N/A     Occupational History    retired      Social History Main Topics    Smoking status: Never Smoker    Smokeless tobacco: Never Used      Comment: former smoker as per [employer]    Alcohol use No      Comment: occasional use as per [employer]    Drug use: No    Sexual activity: Not on file     Other Topics Concern    Not on file     Social History Narrative    Caffeine use       Review of Systems   Constitutional: Negative for fever  Respiratory: Negative for shortness of breath      Cardiovascular: Negative for chest pain  Gastrointestinal: Negative for abdominal pain  Objective:  Vitals:    10/23/18 1453   BP: 128/74   BP Location: Left arm   Patient Position: Sitting   Pulse: (!) 133   SpO2: 98%   Weight: 34 5 kg (76 lb)   Height: 4' 11" (1 499 m)        Physical Exam   Constitutional: She is oriented to person, place, and time  She appears well-developed and well-nourished  Cardiovascular: Normal rate, regular rhythm and normal heart sounds  Pulmonary/Chest: Effort normal and breath sounds normal    Abdominal: Soft  There is no tenderness  Neurological: She is alert and oriented to person, place, and time  Nursing note and vitals reviewed  RESULTS:    No results found for this or any previous visit (from the past 1008 hour(s))  This note was created with voice recognition software  Phonic, grammatical and spelling errors may be present within the note as a result

## 2018-11-01 ENCOUNTER — PROCEDURE VISIT (OUTPATIENT)
Dept: NEUROLOGY | Facility: CLINIC | Age: 83
End: 2018-11-01
Payer: MEDICARE

## 2018-11-01 VITALS — TEMPERATURE: 98.6 F | SYSTOLIC BLOOD PRESSURE: 148 MMHG | DIASTOLIC BLOOD PRESSURE: 92 MMHG

## 2018-11-01 DIAGNOSIS — G24.3 TORTICOLLIS, SPASMODIC: ICD-10-CM

## 2018-11-01 PROCEDURE — 64616 CHEMODENERV MUSC NECK DYSTON: CPT | Performed by: PSYCHIATRY & NEUROLOGY

## 2018-11-01 PROCEDURE — 95874 GUIDE NERV DESTR NEEDLE EMG: CPT | Performed by: PSYCHIATRY & NEUROLOGY

## 2018-11-01 NOTE — PROGRESS NOTES
Chemodenervation  Date/Time: 11/1/2018 2:11 PM  Performed by: Edenilson Bonilla  Authorized by: Edenilson Bonilla     Post-procedure details:     Chemodenervation:  Neck, excluding muscles of the larynx    Neck Muscle Location[de-identified]  Bilateral neck muscle      BOTOX PROCEDURE    Indication: Spasm  Three 100 unit vial's of Botox each from lot number [id number] with an expiration April 2021 were diluted with 2 mL of normal saline to produce a 50 unit per cc dilution  Following aseptic technique a hollow bore 37 mm 27-gauge injectable EMG needle was utilized to identify various muscles in the cervical region  EMG demonstrated rapid firing normal-appearing motor potentials  The following muscles were infiltrated:    Right Splenius Capitis                     50 Units (2 Sites)  Right Trapezius                                 25 Units (2 Sites)  Left Trapezius                                    25 Units (2 Sites)  Left Sternocleidomastoid                  100 units 3 sites  Left levator scapuli                             25 units  Right Sternocleidomastoid            75 units (3 sites)    Total Botox Injected                       300 units    Any minimal bleeding was controlled with pressure  The patient tolerated the procedure well with no complications  There maintaining Botox was discarded as no appropriate Medicare patients were being done   Anticipate reinjection in 3 months

## 2018-11-13 ENCOUNTER — OFFICE VISIT (OUTPATIENT)
Dept: NEUROLOGY | Facility: CLINIC | Age: 83
End: 2018-11-13
Payer: MEDICARE

## 2018-11-13 VITALS
HEART RATE: 116 BPM | HEIGHT: 59 IN | SYSTOLIC BLOOD PRESSURE: 130 MMHG | WEIGHT: 76.4 LBS | DIASTOLIC BLOOD PRESSURE: 80 MMHG | BODY MASS INDEX: 15.4 KG/M2

## 2018-11-13 DIAGNOSIS — G24.3 TORTICOLLIS, SPASMODIC: Primary | ICD-10-CM

## 2018-11-13 PROCEDURE — 99213 OFFICE O/P EST LOW 20 MIN: CPT | Performed by: PSYCHIATRY & NEUROLOGY

## 2018-11-13 NOTE — PROGRESS NOTES
Cole Burno is a 80 y o  female returns in follow-up status post Botox for torticollis    Assessment:  1  Torticollis, spasmodic        Plan:  Continue Botox   Follow-up 2 weeks after    Discussion:  Gill Billings is doing well with present management  The addition of Botox in the left levator scapula has been helpful  Anticipate repeating this at her next Botox  Subjective:    HPI  Gill puente finds that her head tremors under better control following recent Botox injection  She is happy with the additional injection in the left levator scapula which has helped some of her shoulder movement issues  She denies any adverse effects from the injection, specifically no problems with speech, swallowing her breathing  She denies any other health issues      Past Medical History:   Diagnosis Date    Hypertension     Right foot drop     Torticollis        Family History:  Family History   Problem Relation Age of Onset    Cancer Mother     Heart attack Father         as per [employer]        Past Surgical History:  Past Surgical History:   Procedure Laterality Date    TIBIA FRACTURE SURGERY Right 2000    TOTAL HIP ARTHROPLASTY Bilateral        Social History:   reports that she has never smoked  She has never used smokeless tobacco  She reports that she does not drink alcohol or use drugs      Allergies:  Metoprolol      Current Outpatient Prescriptions:     amLODIPine (NORVASC) 5 mg tablet, Take 1 tablet by mouth daily as needed, Disp: , Rfl:     Cholecalciferol (VITAMIN D3) 1000 units CAPS, Take 1 capsule by mouth daily, Disp: , Rfl:     escitalopram (LEXAPRO) 5 mg tablet, Take 1 tablet (5 mg total) by mouth daily, Disp: 30 tablet, Rfl: 3    gabapentin (NEURONTIN) 100 mg capsule, Take 1 capsule (100 mg total) by mouth 2 (two) times a day, Disp: 60 capsule, Rfl: 0    levothyroxine 50 mcg tablet, Take 1 tablet (50 mcg total) by mouth daily, Disp: 30 tablet, Rfl: 0    I have reviewed the past medical, social and family history, current medications, allergies, vitals, review of systems and updated this information as appropriate today    Objective:    Vitals:  Blood pressure 130/80, pulse (!) 116, height 4' 11" (1 499 m), weight 34 7 kg (76 lb 6 4 oz)  Physical Exam    Neurological Exam  There is some continued head tremor side to side however much quieter than prior to her Botox injection  No elevation is noted the left shoulder  She ambulates with a footdrop which is stable since her hip surgery      ROS:    Review of Systems   Constitutional: Negative  Negative for appetite change and fever  HENT: Negative  Negative for hearing loss, tinnitus, trouble swallowing and voice change  Eyes: Negative  Negative for photophobia and pain  Respiratory: Negative  Negative for shortness of breath  Cardiovascular: Negative  Negative for palpitations  Gastrointestinal: Negative  Negative for nausea and vomiting  Endocrine: Negative  Negative for cold intolerance and heat intolerance  Genitourinary: Negative  Negative for dysuria, frequency and urgency  Musculoskeletal: Negative  Negative for myalgias and neck pain  Skin: Negative  Negative for rash  Neurological: Negative  Negative for dizziness, tremors, seizures, syncope, facial asymmetry, speech difficulty, weakness, light-headedness, numbness and headaches  Hematological: Negative  Does not bruise/bleed easily  Psychiatric/Behavioral: Negative  Negative for confusion, hallucinations and sleep disturbance

## 2018-11-27 ENCOUNTER — OFFICE VISIT (OUTPATIENT)
Dept: INTERNAL MEDICINE CLINIC | Facility: CLINIC | Age: 83
End: 2018-11-27
Payer: MEDICARE

## 2018-11-27 VITALS
SYSTOLIC BLOOD PRESSURE: 160 MMHG | HEIGHT: 59 IN | OXYGEN SATURATION: 92 % | BODY MASS INDEX: 15.32 KG/M2 | WEIGHT: 76 LBS | HEART RATE: 131 BPM | DIASTOLIC BLOOD PRESSURE: 94 MMHG

## 2018-11-27 DIAGNOSIS — G24.3 TORTICOLLIS, SPASMODIC: ICD-10-CM

## 2018-11-27 DIAGNOSIS — F41.9 ANXIETY: ICD-10-CM

## 2018-11-27 DIAGNOSIS — R00.0 TACHYCARDIA: ICD-10-CM

## 2018-11-27 DIAGNOSIS — Z23 NEED FOR INFLUENZA VACCINATION: ICD-10-CM

## 2018-11-27 DIAGNOSIS — E03.9 ACQUIRED HYPOTHYROIDISM: Primary | ICD-10-CM

## 2018-11-27 PROCEDURE — 99214 OFFICE O/P EST MOD 30 MIN: CPT | Performed by: INTERNAL MEDICINE

## 2018-11-27 PROCEDURE — G0008 ADMIN INFLUENZA VIRUS VAC: HCPCS | Performed by: INTERNAL MEDICINE

## 2018-11-27 PROCEDURE — 90662 IIV NO PRSV INCREASED AG IM: CPT | Performed by: INTERNAL MEDICINE

## 2018-11-27 RX ORDER — LEVOTHYROXINE SODIUM 0.05 MG/1
50 TABLET ORAL DAILY
Qty: 30 TABLET | Refills: 3 | Status: SHIPPED | OUTPATIENT
Start: 2018-11-27 | End: 2018-12-27 | Stop reason: SDUPTHER

## 2018-11-27 RX ORDER — GABAPENTIN 100 MG/1
100 CAPSULE ORAL 2 TIMES DAILY
Qty: 60 CAPSULE | Refills: 3 | Status: SHIPPED | OUTPATIENT
Start: 2018-11-27 | End: 2018-12-27 | Stop reason: SDUPTHER

## 2018-11-27 NOTE — PROGRESS NOTES
Assessment/Plan:      Will keep her thyroid medicine the same since it would not go well the last time we increased her dose  Encouraged her now to try the Lexapro  We will reassess her in 4 weeks on the medicine  At that point, she would consider a beta-blocker if her heart rate is still elevated    Return in about 4 weeks (around 12/25/2018)  No problem-specific Assessment & Plan notes found for this encounter  Diagnoses and all orders for this visit:    Acquired hypothyroidism  -     levothyroxine 50 mcg tablet; Take 1 tablet (50 mcg total) by mouth daily    Torticollis, spasmodic  -     gabapentin (NEURONTIN) 100 mg capsule; Take 1 capsule (100 mg total) by mouth 2 (two) times a day    Need for influenza vaccination  -     influenza vaccine, [id number], high-dose, PF 0 5 mL, for patients 65 yr+ (FLUZONE HIGH-DOSE)    Anxiety    Tachycardia          Subjective:      Patient ID: Jonathan Argueta is a 80 y o  female  Patient comes in today for follow-up  She is very anxious today because her daughter usually brings her but her daughter fell and injured her shoulder  So she is worried that something happened to her  Her blood work was okay  Her TSH is still slightly elevated but we had tried increasing her dose in the past and she had too many side effects of hyperthyroidism so we have kept her at this dose  She did not start the Lexapro yet  She had other things going on and just did not start yet  She is willing to  She has already picked up the prescription  She still has the tachycardia but does not feel it  She equates all of this to her anxiety          ALLERGIES:  Allergies   Allergen Reactions    Metoprolol Nausea Only       CURRENT MEDICATIONS:    Current Outpatient Prescriptions:     amLODIPine (NORVASC) 5 mg tablet, Take 1 tablet by mouth daily as needed, Disp: , Rfl:     Cholecalciferol (VITAMIN D3) 1000 units CAPS, Take 1 capsule by mouth daily, Disp: , Rfl:     escitalopram (LEXAPRO) 5 mg tablet, Take 1 tablet (5 mg total) by mouth daily, Disp: 30 tablet, Rfl: 3    gabapentin (NEURONTIN) 100 mg capsule, Take 1 capsule (100 mg total) by mouth 2 (two) times a day, Disp: 60 capsule, Rfl: 3    levothyroxine 50 mcg tablet, Take 1 tablet (50 mcg total) by mouth daily, Disp: 30 tablet, Rfl: 3    ACTIVE PROBLEM LIST:  Patient Active Problem List   Diagnosis    Torticollis, spasmodic    Unknown skin lesion    Actinic keratosis    Screening for skin cancer    Basal cell carcinoma of eyebrow    Benign hypertension    Hypothyroidism    Osteoporosis    Anxiety       PAST MEDICAL HISTORY:  Past Medical History:   Diagnosis Date    Hypertension     Right foot drop     Torticollis        PAST SURGICAL HISTORY:  Past Surgical History:   Procedure Laterality Date    TIBIA FRACTURE SURGERY Right 2000    TOTAL HIP ARTHROPLASTY Bilateral        FAMILY HISTORY:  Family History   Problem Relation Age of Onset    Cancer Mother     Heart attack Father         as per [employer]        SOCIAL HISTORY:  Social History     Social History    Marital status:      Spouse name: N/A    Number of children: N/A    Years of education: N/A     Occupational History    retired      Social History Main Topics    Smoking status: Never Smoker    Smokeless tobacco: Never Used      Comment: former smoker as per [employer]    Alcohol use No      Comment: occasional use as per [employer]    Drug use: No    Sexual activity: Not on file     Other Topics Concern    Not on file     Social History Narrative    Caffeine use       Review of Systems   Respiratory: Negative for shortness of breath  Cardiovascular: Negative for chest pain  Gastrointestinal: Negative for abdominal pain           Objective:  Vitals:    11/27/18 1534   BP: 160/94   BP Location: Left arm   Patient Position: Sitting   Pulse: (!) 131   SpO2: 92%   Weight: 34 5 kg (76 lb)   Height: 4' 11" (1 499 m)        Physical Exam Constitutional: She is oriented to person, place, and time  She appears well-developed and well-nourished  Cardiovascular: Regular rhythm and normal heart sounds  Pulmonary/Chest: Effort normal and breath sounds normal    Abdominal: Soft  There is no tenderness  Neurological: She is alert and oriented to person, place, and time  Nursing note and vitals reviewed          RESULTS:    Recent Results (from the past 1008 hour(s))   CBC and differential    Collection Time: 10/23/18  3:53 PM   Result Value Ref Range    WBC 7 39 4 31 - 10 16 Thousand/uL    RBC 4 94 3 81 - 5 12 Million/uL    Hemoglobin 14 3 11 5 - 15 4 g/dL    Hematocrit 44 4 34 8 - 46 1 %    MCV 90 82 - 98 fL    MCH 28 9 26 8 - 34 3 pg    MCHC 32 2 31 4 - 37 4 g/dL    RDW 13 1 11 6 - 15 1 %    MPV 10 1 8 9 - 12 7 fL    Platelets 567 120 - 071 Thousands/uL    nRBC 0 /100 WBCs    Neutrophils Relative 68 43 - 75 %    Immat GRANS % 0 0 - 2 %    Lymphocytes Relative 25 14 - 44 %    Monocytes Relative 5 4 - 12 %    Eosinophils Relative 1 0 - 6 %    Basophils Relative 1 0 - 1 %    Neutrophils Absolute 5 06 1 85 - 7 62 Thousands/µL    Immature Grans Absolute 0 01 0 00 - 0 20 Thousand/uL    Lymphocytes Absolute 1 85 0 60 - 4 47 Thousands/µL    Monocytes Absolute 0 39 0 17 - 1 22 Thousand/µL    Eosinophils Absolute 0 04 0 00 - 0 61 Thousand/µL    Basophils Absolute 0 04 0 00 - 0 10 Thousands/µL   Comprehensive metabolic panel    Collection Time: 10/23/18  3:53 PM   Result Value Ref Range    Sodium 141 136 - 145 mmol/L    Potassium 4 2 3 5 - 5 3 mmol/L    Chloride 100 100 - 108 mmol/L    CO2 32 21 - 32 mmol/L    ANION GAP 9 4 - 13 mmol/L    BUN 19 5 - 25 mg/dL    Creatinine 0 81 0 60 - 1 30 mg/dL    Glucose 109 65 - 140 mg/dL    Calcium 9 7 8 3 - 10 1 mg/dL    AST 20 5 - 45 U/L    ALT 16 12 - 78 U/L    Alkaline Phosphatase 139 (H) 46 - 116 U/L    Total Protein 9 1 (H) 6 4 - 8 2 g/dL    Albumin 3 6 3 5 - 5 0 g/dL    Total Bilirubin 0 40 0 20 - 1 00 mg/dL eGFR 67 ml/min/1 73sq m   TSH, 3rd generation    Collection Time: 10/23/18  3:53 PM   Result Value Ref Range    TSH 3RD GENERATON 4 497 (H) 0 358 - 3 740 uIU/mL   T4, free    Collection Time: 10/23/18  3:53 PM   Result Value Ref Range    Free T4 1 18 0 76 - 1 46 ng/dL       This note was created with voice recognition software  Phonic, grammatical and spelling errors may be present within the note as a result

## 2018-12-27 DIAGNOSIS — E03.9 ACQUIRED HYPOTHYROIDISM: ICD-10-CM

## 2018-12-27 DIAGNOSIS — G24.3 TORTICOLLIS, SPASMODIC: ICD-10-CM

## 2018-12-27 RX ORDER — LEVOTHYROXINE SODIUM 0.05 MG/1
50 TABLET ORAL DAILY
Qty: 30 TABLET | Refills: 5 | Status: SHIPPED | OUTPATIENT
Start: 2018-12-27 | End: 2019-05-13

## 2018-12-27 RX ORDER — GABAPENTIN 100 MG/1
100 CAPSULE ORAL 2 TIMES DAILY
Qty: 60 CAPSULE | Refills: 5 | Status: SHIPPED | OUTPATIENT
Start: 2018-12-27 | End: 2019-12-06 | Stop reason: SDUPTHER

## 2019-01-31 ENCOUNTER — PROCEDURE VISIT (OUTPATIENT)
Dept: NEUROLOGY | Facility: CLINIC | Age: 84
End: 2019-01-31
Payer: MEDICARE

## 2019-01-31 VITALS — TEMPERATURE: 98.2 F | SYSTOLIC BLOOD PRESSURE: 128 MMHG | DIASTOLIC BLOOD PRESSURE: 88 MMHG

## 2019-01-31 DIAGNOSIS — G24.3 SPASMODIC TORTICOLLIS: Primary | ICD-10-CM

## 2019-01-31 PROCEDURE — 95874 GUIDE NERV DESTR NEEDLE EMG: CPT | Performed by: PSYCHIATRY & NEUROLOGY

## 2019-01-31 PROCEDURE — 64616 CHEMODENERV MUSC NECK DYSTON: CPT | Performed by: PSYCHIATRY & NEUROLOGY

## 2019-01-31 NOTE — PROGRESS NOTES
Chemodenervation  Date/Time: 1/31/2019 12:39 PM  Performed by: Noe Hall  Authorized by: Noe Hall     Procedure details:     Guidance: EMG    Post-procedure details:     Chemodenervation:  Neck, excluding muscles of the larynx    Neck Muscle Location[de-identified]  Bilateral neck muscle    BOTOX PROCEDURE    Indication: Spasm  Three 100 unit vial's of Botox each from lot number [id number] with an expiration May 2021 were diluted with 2 mL of normal saline to produce a 50 unit per cc dilution  Following aseptic technique a hollow bore 37 mm 27-gauge injectable EMG needle was utilized to identify various muscles in the cervical region  EMG demonstrated rapid firing normal-appearing motor potentials  The following muscles were infiltrated:    Right Splenius Capitis                     50 Units (2 Sites)  Right Trapezius                                 25 Units (2 Sites)  Left Trapezius                                    25 Units (2 Sites)  Left Sternocleidomastoid              100 Units (3 Sites)  Right Sternocleidomastoid            75 units (3 sites)    Total Botox Injected                       275 units  Total Botox wasted                          25 units      Any minimal bleeding was controlled with pressure  The patient tolerated the procedure well with no complications  There maintaining Botox was discarded as no appropriate Medicare patients were being done   Anticipate reinjection in 3 months

## 2019-02-01 ENCOUNTER — OFFICE VISIT (OUTPATIENT)
Dept: INTERNAL MEDICINE CLINIC | Facility: CLINIC | Age: 84
End: 2019-02-01
Payer: MEDICARE

## 2019-02-01 VITALS
RESPIRATION RATE: 18 BRPM | DIASTOLIC BLOOD PRESSURE: 92 MMHG | BODY MASS INDEX: 15.72 KG/M2 | SYSTOLIC BLOOD PRESSURE: 158 MMHG | WEIGHT: 78 LBS | HEIGHT: 59 IN

## 2019-02-01 DIAGNOSIS — I10 BENIGN HYPERTENSION: Primary | Chronic | ICD-10-CM

## 2019-02-01 DIAGNOSIS — E03.9 ACQUIRED HYPOTHYROIDISM: Chronic | ICD-10-CM

## 2019-02-01 DIAGNOSIS — F41.9 ANXIETY: ICD-10-CM

## 2019-02-01 PROCEDURE — 99214 OFFICE O/P EST MOD 30 MIN: CPT | Performed by: INTERNAL MEDICINE

## 2019-02-01 NOTE — PROGRESS NOTES
Assessment/Plan:     Discussed this with the patient and her daughter  Her daughter is going to try and convince her to take at least half a tablet of the Lexapro  I do not think she will  We will go back to regular follow-up unless she does take the medicine for several weeks and then her daughter will call  Ordered blood work for next visit  No other changes at this point  BMI Counseling: Body mass index is 15 75 kg/m²  Discussed the patient's BMI with her  The BMI is below average  No BMI follow-up plan is appropriate  Patient is 72 years old and weight reduction/weight gain would further complicate their underlying illness  Return in about 6 months (around 8/1/2019)  No problem-specific Assessment & Plan notes found for this encounter  Diagnoses and all orders for this visit:    Benign hypertension  -     CBC and differential; Future  -     Comprehensive metabolic panel; Future    Acquired hypothyroidism  -     T4, free; Future  -     TSH, 3rd generation; Future    Anxiety          Subjective:      Patient ID: Chester Fajardo is a 80 y o  female  Patient comes in today for what was supposed to follow up on the Lexapro  She is with her daughter  As we suspected, she is not taking the medicine  Her anxiety is still a problem  Her blood pressure is elevated because she is anxious  Thyroid levels are controlled          ALLERGIES:  Allergies   Allergen Reactions    Metoprolol Nausea Only       CURRENT MEDICATIONS:    Current Outpatient Prescriptions:     amLODIPine (NORVASC) 5 mg tablet, Take 1 tablet by mouth daily as needed, Disp: , Rfl:     Cholecalciferol (VITAMIN D3) 1000 units CAPS, Take 1 capsule by mouth daily, Disp: , Rfl:     gabapentin (NEURONTIN) 100 mg capsule, Take 1 capsule (100 mg total) by mouth 2 (two) times a day, Disp: 60 capsule, Rfl: 5    levothyroxine 50 mcg tablet, Take 1 tablet (50 mcg total) by mouth daily, Disp: 30 tablet, Rfl: 5    escitalopram (LEXAPRO) 5 mg tablet, Take 1 tablet (5 mg total) by mouth daily (Patient not taking: Reported on 2/1/2019 ), Disp: 30 tablet, Rfl: 3    ACTIVE PROBLEM LIST:  Patient Active Problem List   Diagnosis    Torticollis, spasmodic    Unknown skin lesion    Actinic keratosis    Screening for skin cancer    Basal cell carcinoma of eyebrow    Benign hypertension    Hypothyroidism    Osteoporosis    Anxiety       PAST MEDICAL HISTORY:  Past Medical History:   Diagnosis Date    Hypertension     Right foot drop     Torticollis        PAST SURGICAL HISTORY:  Past Surgical History:   Procedure Laterality Date    TIBIA FRACTURE SURGERY Right 2000    TOTAL HIP ARTHROPLASTY Bilateral        FAMILY HISTORY:  Family History   Problem Relation Age of Onset    Cancer Mother     Heart attack Father         as per [employer]        SOCIAL HISTORY:  Social History     Social History    Marital status:      Spouse name: N/A    Number of children: N/A    Years of education: N/A     Occupational History    retired      Social History Main Topics    Smoking status: Never Smoker    Smokeless tobacco: Never Used      Comment: former smoker as per [employer]    Alcohol use No      Comment: occasional use as per [employer]    Drug use: No    Sexual activity: Not on file     Other Topics Concern    Not on file     Social History Narrative    Caffeine use       Review of Systems   Respiratory: Negative for shortness of breath  Cardiovascular: Negative for chest pain  Gastrointestinal: Negative for abdominal pain  Objective:  Vitals:    02/01/19 1538   BP: 158/92   BP Location: Left arm   Patient Position: Sitting   Cuff Size: Adult   Resp: 18   Weight: 35 4 kg (78 lb)   Height: 4' 11" (1 499 m)     Body mass index is 15 75 kg/m²  Physical Exam   Constitutional: She is oriented to person, place, and time  She appears well-developed and well-nourished     Cardiovascular: Normal rate, regular rhythm and normal heart sounds  Pulmonary/Chest: Effort normal and breath sounds normal    Abdominal: Soft  There is no tenderness  Neurological: She is alert and oriented to person, place, and time  Nursing note and vitals reviewed  RESULTS:    No results found for this or any previous visit (from the past 1008 hour(s))  This note was created with voice recognition software  Phonic, grammatical and spelling errors may be present within the note as a result

## 2019-02-18 ENCOUNTER — OFFICE VISIT (OUTPATIENT)
Dept: NEUROLOGY | Facility: CLINIC | Age: 84
End: 2019-02-18
Payer: MEDICARE

## 2019-02-18 VITALS
DIASTOLIC BLOOD PRESSURE: 100 MMHG | SYSTOLIC BLOOD PRESSURE: 140 MMHG | BODY MASS INDEX: 14.91 KG/M2 | HEART RATE: 114 BPM | WEIGHT: 73.8 LBS

## 2019-02-18 DIAGNOSIS — G24.3 TORTICOLLIS, SPASMODIC: Primary | ICD-10-CM

## 2019-02-18 PROCEDURE — 99213 OFFICE O/P EST LOW 20 MIN: CPT | Performed by: PSYCHIATRY & NEUROLOGY

## 2019-02-18 NOTE — PROGRESS NOTES
Mana Hoang is a 80 y o  female who returns in follow-up today with history of torticollis status post recent Botox    Assessment:  1  Torticollis, spasmodic        Plan:  Repeat Botox in made  Follow-up 2 weeks after    Discussion:  Kellys torticollis is under reasonable control with current therapy  Continue current dose of Botox and I will see her back in follow-up 2 weeks after her next injection in early May      Subjective:    HPI  Roxana Kim denies any adverse effects following her recent Botox injection, specifically no problems with breathing, speaking or swallowing  She finds that the tremors under better control but does seem to be more prominent when she is anxious or nervous  She states she is coming down with a cold and feels more sensitive to the smell of her laundry detergent      Past Medical History:   Diagnosis Date    Hypertension     Right foot drop     Torticollis        Family History:  Family History   Problem Relation Age of Onset    Cancer Mother     Heart attack Father         as per [employer]        Past Surgical History:  Past Surgical History:   Procedure Laterality Date    TIBIA FRACTURE SURGERY Right 2000    TOTAL HIP ARTHROPLASTY Bilateral        Social History:   reports that she has never smoked  She has never used smokeless tobacco  She reports that she does not drink alcohol or use drugs      Allergies:  Metoprolol      Current Outpatient Medications:     Cholecalciferol (VITAMIN D3) 1000 units CAPS, Take 1 capsule by mouth daily, Disp: , Rfl:     escitalopram (LEXAPRO) 5 mg tablet, Take 1 tablet (5 mg total) by mouth daily, Disp: 30 tablet, Rfl: 3    gabapentin (NEURONTIN) 100 mg capsule, Take 1 capsule (100 mg total) by mouth 2 (two) times a day, Disp: 60 capsule, Rfl: 5    levothyroxine 50 mcg tablet, Take 1 tablet (50 mcg total) by mouth daily, Disp: 30 tablet, Rfl: 5    I have reviewed the past medical, social and family history, current medications, allergies, vitals, review of systems and updated this information as appropriate today     Objective:    Vitals:  Blood pressure 140/100, pulse (!) 114, weight 33 5 kg (73 lb 12 8 oz)  Physical Exam    Neurological Exam  GENERAL:  Well-developed well-nourished woman in no acute distress  HEENT/NECK: Head is atraumatic normocephalic, neck is supple with occasional side-to-side head tremor  NEUROLOGIC:  Mental Status: Awake and alert without aphasia  Cranial Nerves: Extraocular movements are full  Face is symmetrical  Coordination:  She ambulates with a steppage gait pattern on the right, stable with a straight cane            ROS:    Review of Systems   Constitutional: Negative  HENT: Negative  Sensitive to certain smells   Eyes: Negative  Respiratory: Negative  Cardiovascular: Negative  Gastrointestinal: Positive for nausea  Endocrine: Negative  Genitourinary: Negative  Musculoskeletal: Negative  Allergic/Immunologic:        Sensitivity to detergents   Neurological: Negative  Hematological: Negative  Psychiatric/Behavioral: The patient is nervous/anxious (nervous)  All other systems reviewed and are negative

## 2019-05-02 ENCOUNTER — TELEPHONE (OUTPATIENT)
Dept: NEUROLOGY | Facility: CLINIC | Age: 84
End: 2019-05-02

## 2019-05-07 ENCOUNTER — TELEPHONE (OUTPATIENT)
Dept: INTERNAL MEDICINE CLINIC | Facility: CLINIC | Age: 84
End: 2019-05-07

## 2019-05-08 ENCOUNTER — TELEPHONE (OUTPATIENT)
Dept: NEUROLOGY | Facility: CLINIC | Age: 84
End: 2019-05-08

## 2019-05-08 ENCOUNTER — APPOINTMENT (OUTPATIENT)
Dept: LAB | Facility: CLINIC | Age: 84
End: 2019-05-08
Payer: MEDICARE

## 2019-05-08 ENCOUNTER — TRANSCRIBE ORDERS (OUTPATIENT)
Dept: LAB | Facility: CLINIC | Age: 84
End: 2019-05-08

## 2019-05-08 DIAGNOSIS — E03.9 ACQUIRED HYPOTHYROIDISM: Chronic | ICD-10-CM

## 2019-05-08 DIAGNOSIS — I10 BENIGN HYPERTENSION: Chronic | ICD-10-CM

## 2019-05-08 LAB
ALBUMIN SERPL BCP-MCNC: 3.7 G/DL (ref 3.5–5)
ALP SERPL-CCNC: 120 U/L (ref 46–116)
ALT SERPL W P-5'-P-CCNC: 15 U/L (ref 12–78)
ANION GAP SERPL CALCULATED.3IONS-SCNC: 2 MMOL/L (ref 4–13)
AST SERPL W P-5'-P-CCNC: 20 U/L (ref 5–45)
BASOPHILS # BLD AUTO: 0.05 THOUSANDS/ΜL (ref 0–0.1)
BASOPHILS NFR BLD AUTO: 1 % (ref 0–1)
BILIRUB SERPL-MCNC: 0.59 MG/DL (ref 0.2–1)
BUN SERPL-MCNC: 11 MG/DL (ref 5–25)
CALCIUM SERPL-MCNC: 9.1 MG/DL (ref 8.3–10.1)
CHLORIDE SERPL-SCNC: 103 MMOL/L (ref 100–108)
CO2 SERPL-SCNC: 31 MMOL/L (ref 21–32)
CREAT SERPL-MCNC: 0.73 MG/DL (ref 0.6–1.3)
EOSINOPHIL # BLD AUTO: 0.04 THOUSAND/ΜL (ref 0–0.61)
EOSINOPHIL NFR BLD AUTO: 1 % (ref 0–6)
ERYTHROCYTE [DISTWIDTH] IN BLOOD BY AUTOMATED COUNT: 14.4 % (ref 11.6–15.1)
GFR SERPL CREATININE-BSD FRML MDRD: 76 ML/MIN/1.73SQ M
GLUCOSE P FAST SERPL-MCNC: 91 MG/DL (ref 65–99)
HCT VFR BLD AUTO: 38.7 % (ref 34.8–46.1)
HGB BLD-MCNC: 11.8 G/DL (ref 11.5–15.4)
IMM GRANULOCYTES # BLD AUTO: 0.02 THOUSAND/UL (ref 0–0.2)
IMM GRANULOCYTES NFR BLD AUTO: 0 % (ref 0–2)
LYMPHOCYTES # BLD AUTO: 2.38 THOUSANDS/ΜL (ref 0.6–4.47)
LYMPHOCYTES NFR BLD AUTO: 34 % (ref 14–44)
MCH RBC QN AUTO: 28 PG (ref 26.8–34.3)
MCHC RBC AUTO-ENTMCNC: 30.5 G/DL (ref 31.4–37.4)
MCV RBC AUTO: 92 FL (ref 82–98)
MONOCYTES # BLD AUTO: 0.5 THOUSAND/ΜL (ref 0.17–1.22)
MONOCYTES NFR BLD AUTO: 7 % (ref 4–12)
NEUTROPHILS # BLD AUTO: 4.06 THOUSANDS/ΜL (ref 1.85–7.62)
NEUTS SEG NFR BLD AUTO: 57 % (ref 43–75)
NRBC BLD AUTO-RTO: 0 /100 WBCS
PLATELET # BLD AUTO: 249 THOUSANDS/UL (ref 149–390)
PMV BLD AUTO: 11.7 FL (ref 8.9–12.7)
POTASSIUM SERPL-SCNC: 4.6 MMOL/L (ref 3.5–5.3)
PROT SERPL-MCNC: 9 G/DL (ref 6.4–8.2)
RBC # BLD AUTO: 4.21 MILLION/UL (ref 3.81–5.12)
SODIUM SERPL-SCNC: 136 MMOL/L (ref 136–145)
T4 FREE SERPL-MCNC: 1.18 NG/DL (ref 0.76–1.46)
TSH SERPL DL<=0.05 MIU/L-ACNC: 4.1 UIU/ML (ref 0.36–3.74)
WBC # BLD AUTO: 7.05 THOUSAND/UL (ref 4.31–10.16)

## 2019-05-08 PROCEDURE — 36415 COLL VENOUS BLD VENIPUNCTURE: CPT

## 2019-05-08 PROCEDURE — 84439 ASSAY OF FREE THYROXINE: CPT

## 2019-05-08 PROCEDURE — 85025 COMPLETE CBC W/AUTO DIFF WBC: CPT

## 2019-05-08 PROCEDURE — 84443 ASSAY THYROID STIM HORMONE: CPT

## 2019-05-08 PROCEDURE — 80053 COMPREHEN METABOLIC PANEL: CPT

## 2019-05-10 ENCOUNTER — TELEPHONE (OUTPATIENT)
Dept: INTERNAL MEDICINE CLINIC | Facility: CLINIC | Age: 84
End: 2019-05-10

## 2019-05-13 ENCOUNTER — OFFICE VISIT (OUTPATIENT)
Dept: INTERNAL MEDICINE CLINIC | Facility: CLINIC | Age: 84
End: 2019-05-13
Payer: MEDICARE

## 2019-05-13 VITALS
RESPIRATION RATE: 18 BRPM | WEIGHT: 75 LBS | DIASTOLIC BLOOD PRESSURE: 98 MMHG | HEIGHT: 59 IN | BODY MASS INDEX: 15.12 KG/M2 | HEART RATE: 123 BPM | SYSTOLIC BLOOD PRESSURE: 144 MMHG

## 2019-05-13 DIAGNOSIS — R00.0 TACHYCARDIA: ICD-10-CM

## 2019-05-13 DIAGNOSIS — E03.9 ACQUIRED HYPOTHYROIDISM: Primary | Chronic | ICD-10-CM

## 2019-05-13 DIAGNOSIS — F41.9 ANXIETY: ICD-10-CM

## 2019-05-13 PROCEDURE — 99214 OFFICE O/P EST MOD 30 MIN: CPT | Performed by: INTERNAL MEDICINE

## 2019-05-13 RX ORDER — LEVOTHYROXINE SODIUM 50 MCG
50 TABLET ORAL DAILY
Qty: 30 TABLET | Refills: 3 | Status: SHIPPED | OUTPATIENT
Start: 2019-05-13 | End: 2019-06-06

## 2019-05-16 ENCOUNTER — TELEPHONE (OUTPATIENT)
Dept: INTERNAL MEDICINE CLINIC | Facility: CLINIC | Age: 84
End: 2019-05-16

## 2019-05-23 ENCOUNTER — PROCEDURE VISIT (OUTPATIENT)
Dept: NEUROLOGY | Facility: CLINIC | Age: 84
End: 2019-05-23
Payer: MEDICARE

## 2019-05-23 DIAGNOSIS — G24.3 TORTICOLLIS, SPASMODIC: Primary | ICD-10-CM

## 2019-05-23 PROCEDURE — 64616 CHEMODENERV MUSC NECK DYSTON: CPT | Performed by: PSYCHIATRY & NEUROLOGY

## 2019-05-23 PROCEDURE — 95874 GUIDE NERV DESTR NEEDLE EMG: CPT | Performed by: PSYCHIATRY & NEUROLOGY

## 2019-05-24 ENCOUNTER — HOSPITAL ENCOUNTER (OUTPATIENT)
Dept: NON INVASIVE DIAGNOSTICS | Facility: HOSPITAL | Age: 84
Discharge: HOME | End: 2019-05-24
Attending: INTERNAL MEDICINE
Payer: MEDICARE

## 2019-05-24 DIAGNOSIS — R00.0 TACHYCARDIA: ICD-10-CM

## 2019-05-24 PROCEDURE — 93225 XTRNL ECG REC<48 HRS REC: CPT

## 2019-05-24 PROCEDURE — 93226 XTRNL ECG REC<48 HR SCAN A/R: CPT

## 2019-05-30 PROCEDURE — 93227 XTRNL ECG REC<48 HR R&I: CPT

## 2019-06-06 ENCOUNTER — OFFICE VISIT (OUTPATIENT)
Dept: INTERNAL MEDICINE CLINIC | Facility: CLINIC | Age: 84
End: 2019-06-06
Payer: MEDICARE

## 2019-06-06 VITALS
DIASTOLIC BLOOD PRESSURE: 102 MMHG | OXYGEN SATURATION: 94 % | SYSTOLIC BLOOD PRESSURE: 182 MMHG | BODY MASS INDEX: 14.72 KG/M2 | HEART RATE: 120 BPM | HEIGHT: 59 IN | WEIGHT: 73 LBS | RESPIRATION RATE: 16 BRPM

## 2019-06-06 DIAGNOSIS — F41.9 ANXIETY: ICD-10-CM

## 2019-06-06 DIAGNOSIS — E03.9 ACQUIRED HYPOTHYROIDISM: Primary | Chronic | ICD-10-CM

## 2019-06-06 DIAGNOSIS — G24.3 TORTICOLLIS, SPASMODIC: ICD-10-CM

## 2019-06-06 PROCEDURE — 99214 OFFICE O/P EST MOD 30 MIN: CPT | Performed by: INTERNAL MEDICINE

## 2019-06-06 RX ORDER — LEVOTHYROXINE SODIUM 0.03 MG/1
25 TABLET ORAL
Qty: 30 TABLET | Refills: 5 | Status: SHIPPED | OUTPATIENT
Start: 2019-06-06 | End: 2019-12-17 | Stop reason: SDUPTHER

## 2019-07-08 ENCOUNTER — OFFICE VISIT (OUTPATIENT)
Dept: INTERNAL MEDICINE CLINIC | Facility: CLINIC | Age: 84
End: 2019-07-08
Payer: MEDICARE

## 2019-07-08 VITALS
DIASTOLIC BLOOD PRESSURE: 98 MMHG | RESPIRATION RATE: 14 BRPM | HEART RATE: 132 BPM | SYSTOLIC BLOOD PRESSURE: 146 MMHG | WEIGHT: 75 LBS | OXYGEN SATURATION: 98 % | BODY MASS INDEX: 15.12 KG/M2 | HEIGHT: 59 IN

## 2019-07-08 DIAGNOSIS — E03.9 ACQUIRED HYPOTHYROIDISM: Primary | Chronic | ICD-10-CM

## 2019-07-08 PROCEDURE — 99214 OFFICE O/P EST MOD 30 MIN: CPT | Performed by: INTERNAL MEDICINE

## 2019-07-08 NOTE — PROGRESS NOTES
Assessment/Plan:     Very hard to treat her  Her daughter and I basically told her she needs to take the medicine for her thyroid  Her anxiety is causing the problem  She should have no reaction immediately after swallowing the pill  I told her I do not care if she takes it with dinner or even at bedtime  She just needs to get the medicine into her period  Total time, 25 minutes, greater than 50% spent face to face in counseling and coordination of care  BMI Counseling: Body mass index is 15 15 kg/m²  Discussed the patient's BMI with her  The BMI is below average  BMI counseling and education was provided to the patient  Patient was advised to gain weight  Return in about 6 weeks (around 8/19/2019)  No problem-specific Assessment & Plan notes found for this encounter  Diagnoses and all orders for this visit:    Acquired hypothyroidism  -     TSH, 3rd generation; Future  -     T4, free; Future  -     T3, free; Future          Subjective:      Patient ID: Cecy Bear is a 80 y o  female  Patient comes in today for follow-up with her daughter  She is still giving us difficulties with taking the medicine  She takes a piece of the 25 mcg levothyroxine and as soon as she swallows it, she feels she is getting symptoms  I told her this is not possible  I told her this is all her anxiety  Her daughter agrees  We have tried to convince her  She is now obsessing over this period she is taking the 1 gabapentin a day  That did not change her symptoms  I warned her that her thyroid is going to become under active at this rate and she will feel even sicker than she does now  Offered to send her to counseling  She refused  I have tried numerous anxiety medicines  She has stopped them all        ALLERGIES:  Allergies   Allergen Reactions    Metoprolol Nausea Only       CURRENT MEDICATIONS:    Current Outpatient Medications:     Cholecalciferol (VITAMIN D3) 1000 units CAPS, Take 1 capsule by mouth daily, Disp: , Rfl:     gabapentin (NEURONTIN) 100 mg capsule, Take 1 capsule (100 mg total) by mouth 2 (two) times a day, Disp: 60 capsule, Rfl: 5    levothyroxine 25 mcg tablet, Take 1 tablet (25 mcg total) by mouth daily in the early morning, Disp: 30 tablet, Rfl: 5    ACTIVE PROBLEM LIST:  Patient Active Problem List   Diagnosis    Torticollis, spasmodic    Unknown skin lesion    Actinic keratosis    Screening for skin cancer    Basal cell carcinoma of eyebrow    Benign hypertension    Hypothyroidism    Osteoporosis    Anxiety       PAST MEDICAL HISTORY:  Past Medical History:   Diagnosis Date    Hypertension     Right foot drop     Torticollis        PAST SURGICAL HISTORY:  Past Surgical History:   Procedure Laterality Date    TIBIA FRACTURE SURGERY Right 2000    TOTAL HIP ARTHROPLASTY Bilateral        FAMILY HISTORY:  Family History   Problem Relation Age of Onset    Cancer Mother     Heart attack Father         as per [employer]        SOCIAL HISTORY:  Social History     Socioeconomic History    Marital status:      Spouse name: Not on file    Number of children: Not on file    Years of education: Not on file    Highest education level: Not on file   Occupational History    Occupation: retired   Social Needs    Financial resource strain: Not on file    Food insecurity:     Worry: Not on file     Inability: Not on file   Admeld needs:     Medical: Not on file     Non-medical: Not on file   Tobacco Use    Smoking status: Never Smoker    Smokeless tobacco: Never Used    Tobacco comment: former smoker as per [employer]   Substance and Sexual Activity    Alcohol use: No     Comment: occasional use as per [employer]    Drug use: No    Sexual activity: Not on file   Lifestyle    Physical activity:     Days per week: Not on file     Minutes per session: Not on file    Stress: Not on file   Relationships    Social connections:     Talks on phone: Not on file Gets together: Not on file     Attends Orthodox service: Not on file     Active member of club or organization: Not on file     Attends meetings of clubs or organizations: Not on file     Relationship status: Not on file    Intimate partner violence:     Fear of current or ex partner: Not on file     Emotionally abused: Not on file     Physically abused: Not on file     Forced sexual activity: Not on file   Other Topics Concern    Not on file   Social History Narrative    Caffeine use       Review of Systems   Respiratory: Negative for shortness of breath  Cardiovascular: Negative for chest pain  Gastrointestinal: Negative for abdominal pain  Objective:  Vitals:    07/08/19 1538   BP: 146/98   BP Location: Right arm   Patient Position: Sitting   Cuff Size: Standard   Pulse: (!) 132   Resp: 14   SpO2: 98%   Weight: 34 kg (75 lb)   Height: 4' 11" (1 499 m)     Body mass index is 15 15 kg/m²  Physical Exam   Constitutional: She appears well-developed and well-nourished  Psychiatric:   Anxious   Nursing note and vitals reviewed  RESULTS:    No results found for this or any previous visit (from the past 1008 hour(s))  This note was created with voice recognition software  Phonic, grammatical and spelling errors may be present within the note as a result

## 2019-07-29 ENCOUNTER — DOCUMENTATION (OUTPATIENT)
Dept: NEUROLOGY | Facility: CLINIC | Age: 84
End: 2019-07-29

## 2019-07-29 NOTE — PROGRESS NOTES
General 07/25/2019  4:56 PM [facility] -   Note    BOTOX 300 UNITS - NO AUTH NEEDED    STOCK

## 2019-08-22 ENCOUNTER — PROCEDURE VISIT (OUTPATIENT)
Dept: NEUROLOGY | Facility: CLINIC | Age: 84
End: 2019-08-22
Payer: MEDICARE

## 2019-08-22 DIAGNOSIS — G24.3 TORTICOLLIS, SPASMODIC: Primary | ICD-10-CM

## 2019-08-22 PROCEDURE — 64616 CHEMODENERV MUSC NECK DYSTON: CPT | Performed by: PSYCHIATRY & NEUROLOGY

## 2019-08-22 PROCEDURE — 95874 GUIDE NERV DESTR NEEDLE EMG: CPT | Performed by: PSYCHIATRY & NEUROLOGY

## 2019-08-22 NOTE — PROGRESS NOTES
Chemodenervation  Date/Time: 8/22/2019 1:26 PM  Performed by: Thad Camacho MD  Authorized by: Thad Camacho MD     Procedure details:     Guidance: EMG    Post-procedure details:     Chemodenervation:  Neck, excluding muscles of the larynx    Neck Muscle Location[de-identified]  Bilateral neck muscle    BOTOX PROCEDURE     Indication:  Torticollis  Three 100 unit vial's of Botox each from lot number  C2 with an expiration September 2022 were diluted with 2 mL of normal saline to produce a 50 unit per cc dilution  Following aseptic technique a hollow bore 37 mm 27-gauge injectable EMG needle was utilized to identify various muscles in the cervical region  EMG demonstrated rapid firing normal-appearing motor potentials  The following muscles were infiltrated:     Right Splenius Capitis                     50 Units (2 Sites)  Right Trapezius                                 25 Units (2 Sites)  Left Trapezius                                    25 Units (2 Sites)  Left Sternocleidomastoid              100 Units (3 Sites)  Right Sternocleidomastoid            75 units (3 sites)     Total Botox Injected                       275 units  Total Botox wasted                          25 units       Any minimal bleeding was controlled with pressure  The patient tolerated the procedure well with no complications  There maintaining Botox was discarded as no appropriate Medicare patients were being done   Anticipate reinjection in 3 months

## 2019-09-03 ENCOUNTER — APPOINTMENT (OUTPATIENT)
Dept: LAB | Facility: CLINIC | Age: 84
End: 2019-09-03
Payer: MEDICARE

## 2019-09-03 ENCOUNTER — TRANSCRIBE ORDERS (OUTPATIENT)
Dept: LAB | Facility: CLINIC | Age: 84
End: 2019-09-03

## 2019-09-03 DIAGNOSIS — E03.9 ACQUIRED HYPOTHYROIDISM: Chronic | ICD-10-CM

## 2019-09-03 LAB
T3FREE SERPL-MCNC: 2.03 PG/ML (ref 2.3–4.2)
T4 FREE SERPL-MCNC: 0.8 NG/DL (ref 0.76–1.46)
TSH SERPL DL<=0.05 MIU/L-ACNC: 14.7 UIU/ML (ref 0.36–3.74)

## 2019-09-03 PROCEDURE — 84443 ASSAY THYROID STIM HORMONE: CPT

## 2019-09-03 PROCEDURE — 84481 FREE ASSAY (FT-3): CPT

## 2019-09-03 PROCEDURE — 84439 ASSAY OF FREE THYROXINE: CPT

## 2019-09-03 PROCEDURE — 36415 COLL VENOUS BLD VENIPUNCTURE: CPT

## 2019-09-09 ENCOUNTER — OFFICE VISIT (OUTPATIENT)
Dept: NEUROLOGY | Facility: CLINIC | Age: 84
End: 2019-09-09
Payer: MEDICARE

## 2019-09-09 VITALS
BODY MASS INDEX: 15.12 KG/M2 | SYSTOLIC BLOOD PRESSURE: 142 MMHG | WEIGHT: 75 LBS | DIASTOLIC BLOOD PRESSURE: 98 MMHG | HEIGHT: 59 IN | HEART RATE: 119 BPM

## 2019-09-09 DIAGNOSIS — G24.3 TORTICOLLIS, SPASMODIC: Primary | ICD-10-CM

## 2019-09-09 PROCEDURE — 99213 OFFICE O/P EST LOW 20 MIN: CPT | Performed by: PSYCHIATRY & NEUROLOGY

## 2019-09-09 NOTE — PROGRESS NOTES
Warner Musa is a 80 y o  female returns in follow-up today with history of torticollis status post recent Botox    Assessment:  1  Torticollis, spasmodic        Plan:  Repeat Botox in November  Follow-up afterwards    Discussion:  Leon flores torticollis is under reasonable control with present management  Anticipate repeating Botox in November  If cervical lymph node becomes larger more tender she should follow up with her primary doctor      Subjective:    HPI  Leon puente denies any adverse effects following recent Botox injection  She states her head is a little bit more shaky and she is having some discomfort in her neck  She denies any radicular symptoms  She denies any problems with speech swallowing or breathing  Past Medical History:   Diagnosis Date    Hypertension     Right foot drop     Torticollis        Family History:  Family History   Problem Relation Age of Onset    Cancer Mother     Heart attack Father         as per [employer]        Past Surgical History:  Past Surgical History:   Procedure Laterality Date    TIBIA FRACTURE SURGERY Right 2000    TOTAL HIP ARTHROPLASTY Bilateral        Social History:   reports that she has never smoked  She has never used smokeless tobacco  She reports that she does not drink alcohol or use drugs      Allergies:  Metoprolol      Current Outpatient Medications:     Cholecalciferol (VITAMIN D3) 1000 units CAPS, Take 1 capsule by mouth daily, Disp: , Rfl:     gabapentin (NEURONTIN) 100 mg capsule, Take 1 capsule (100 mg total) by mouth 2 (two) times a day (Patient taking differently: Take 100 mg by mouth daily ), Disp: 60 capsule, Rfl: 5    levothyroxine 25 mcg tablet, Take 1 tablet (25 mcg total) by mouth daily in the early morning (Patient not taking: Reported on 9/9/2019), Disp: 30 tablet, Rfl: 5    I have reviewed the past medical, social and family history, current medications, allergies, vitals, review of systems and updated this information as appropriate today     Objective:    Vitals:    Vitals:    09/09/19 1319   BP: 142/98   BP Location: Left arm   Patient Position: Sitting   Cuff Size: Adult   Pulse: (!) 119   Weight: 34 kg (75 lb)   Height: 4' 11" (1 499 m)         Physical Exam    Neurological Exam  GENERAL:  Well-developed well-nourished woman in no acute distress  HEENT/NECK: Head is atraumatic normocephalic, neck is supple with good range of motion  There is some spasm along the left posterior cervical paraspinal region  Head tremors noted  Lymph node is noted in the right anterior cervical chain with some tenderness to palpation  CARDIOVASCULAR:  No  Carotid bruit  NEUROLOGIC:  Mental Status: Awake and alert without aphasia  Cranial Nerves: Extraocular movements are full  Face is symmetrical  Motor:  No drift is noted on arm extension  Coordination:  Finger-to-nose testing is performed accurately  She ambulates with a steppage gait pattern favoring the left side        ROS:    Review of Systems   Constitutional: Negative  HENT: Positive for postnasal drip  Eyes: Negative  Respiratory: Negative  Cardiovascular: Negative  Gastrointestinal: Positive for constipation  Endocrine: Negative  Genitourinary: Negative  Musculoskeletal: Positive for gait problem  Skin: Negative  Allergic/Immunologic: Negative  Neurological: Positive for dizziness and tremors  Hematological: Negative  Psychiatric/Behavioral: Negative

## 2019-09-11 ENCOUNTER — OFFICE VISIT (OUTPATIENT)
Dept: INTERNAL MEDICINE CLINIC | Facility: CLINIC | Age: 84
End: 2019-09-11
Payer: MEDICARE

## 2019-09-11 VITALS
SYSTOLIC BLOOD PRESSURE: 128 MMHG | BODY MASS INDEX: 15.12 KG/M2 | WEIGHT: 75 LBS | DIASTOLIC BLOOD PRESSURE: 74 MMHG | OXYGEN SATURATION: 97 % | HEART RATE: 102 BPM | HEIGHT: 59 IN

## 2019-09-11 DIAGNOSIS — F41.9 ANXIETY: ICD-10-CM

## 2019-09-11 DIAGNOSIS — G24.3 TORTICOLLIS, SPASMODIC: ICD-10-CM

## 2019-09-11 DIAGNOSIS — E03.9 ACQUIRED HYPOTHYROIDISM: Primary | Chronic | ICD-10-CM

## 2019-09-11 PROCEDURE — 99214 OFFICE O/P EST MOD 30 MIN: CPT | Performed by: INTERNAL MEDICINE

## 2019-09-11 NOTE — PROGRESS NOTES
Assessment/Plan:     Went round and around with her again about taking the medicine  Clearly explained to her that her levels are now moving hypothyroidism as expected  I told her I do not care when she takes the medicine  She can take it with dinner  But she needs to take the thyroid medicine  Her daughter agrees  I told her to try Zantac for her upset stomach after eating but she already said she is probably not going to do that  I told her if it continues, she will need to see GI for further workup  Her daughter agrees  Her daughter is going to trying get her aides that might help with her anxiety  Total time, 25 minutes, greater than 50% spent face to face in counseling and coordination of care  BMI Counseling: Body mass index is 15 15 kg/m²  Discussed the patient's BMI with her  The BMI is below normal  Patient was advised to gain weight  Return in about 3 months (around 12/11/2019)  No problem-specific Assessment & Plan notes found for this encounter  Diagnoses and all orders for this visit:    Acquired hypothyroidism  -     TSH, 3rd generation; Future  -     T4, free; Future    Torticollis, spasmodic    Anxiety          Subjective:      Patient ID: Irasema Etienne is a 80 y o  female  Patient comes in today for follow-up she is still not taking the thyroid medicine  Her thyroid levels are moving into the hypothyroid range as I told her  She is starting to get more constipation  She is down to 1 gabapentin a day  Her daughter has tried everything to try and convince her to take the medicine  She has significant anxiety  I have tried various medicines over the years which she has stopped them all  She tells me today that she gets an upset stomach after eating now  This has been going on for a while apparently        ALLERGIES:  Allergies   Allergen Reactions    Metoprolol Nausea Only       CURRENT MEDICATIONS:    Current Outpatient Medications:     Cholecalciferol (VITAMIN D3) 1000 units CAPS, Take 1 capsule by mouth daily, Disp: , Rfl:     gabapentin (NEURONTIN) 100 mg capsule, Take 1 capsule (100 mg total) by mouth 2 (two) times a day (Patient taking differently: Take 100 mg by mouth daily ), Disp: 60 capsule, Rfl: 5    levothyroxine 25 mcg tablet, Take 1 tablet (25 mcg total) by mouth daily in the early morning (Patient not taking: Reported on 9/9/2019), Disp: 30 tablet, Rfl: 5    ACTIVE PROBLEM LIST:  Patient Active Problem List   Diagnosis    Torticollis, spasmodic    Unknown skin lesion    Actinic keratosis    Screening for skin cancer    Basal cell carcinoma of eyebrow    Benign hypertension    Hypothyroidism    Osteoporosis    Anxiety       PAST MEDICAL HISTORY:  Past Medical History:   Diagnosis Date    Hypertension     Right foot drop     Torticollis        PAST SURGICAL HISTORY:  Past Surgical History:   Procedure Laterality Date    TIBIA FRACTURE SURGERY Right 2000    TOTAL HIP ARTHROPLASTY Bilateral        FAMILY HISTORY:  Family History   Problem Relation Age of Onset    Cancer Mother     Heart attack Father         as per [employer]        SOCIAL HISTORY:  Social History     Socioeconomic History    Marital status:      Spouse name: Not on file    Number of children: Not on file    Years of education: Not on file    Highest education level: Not on file   Occupational History    Occupation: retired   Social Needs    Financial resource strain: Not on file    Food insecurity:     Worry: Not on file     Inability: Not on file   [employer] needs:     Medical: Not on file     Non-medical: Not on file   Tobacco Use    Smoking status: Never Smoker    Smokeless tobacco: Never Used    Tobacco comment: former smoker as per [employer]   Substance and Sexual Activity    Alcohol use: No     Comment: occasional use as per [employer]    Drug use: No    Sexual activity: Not on file   Lifestyle    Physical activity:     Days per week: Not on file     Minutes per session: Not on file    Stress: Not on file   Relationships    Social connections:     Talks on phone: Not on file     Gets together: Not on file     Attends Confucianist service: Not on file     Active member of club or organization: Not on file     Attends meetings of clubs or organizations: Not on file     Relationship status: Not on file    Intimate partner violence:     Fear of current or ex partner: Not on file     Emotionally abused: Not on file     Physically abused: Not on file     Forced sexual activity: Not on file   Other Topics Concern    Not on file   Social History Narrative    Caffeine use       Review of Systems   Respiratory: Negative for shortness of breath  Cardiovascular: Negative for chest pain  Gastrointestinal: Positive for abdominal pain  Objective:  Vitals:    09/11/19 1218   BP: 128/74   BP Location: Right arm   Patient Position: Sitting   Cuff Size: Standard   Pulse: 102   SpO2: 97%   Weight: 34 kg (75 lb)   Height: 4' 11" (1 499 m)     Body mass index is 15 15 kg/m²  Physical Exam   Constitutional: She is oriented to person, place, and time  She appears well-developed and well-nourished  Cardiovascular: Normal rate, regular rhythm and normal heart sounds  Pulmonary/Chest: Effort normal and breath sounds normal    Abdominal: Soft  There is no tenderness  Neurological: She is alert and oriented to person, place, and time  Nursing note and vitals reviewed  RESULTS:    Recent Results (from the past 1008 hour(s))   TSH, 3rd generation    Collection Time: 09/03/19 11:47 AM   Result Value Ref Range    TSH 3RD GENERATON 14 700 (H) 0 358 - 3 740 uIU/mL   T4, free    Collection Time: 09/03/19 11:47 AM   Result Value Ref Range    Free T4 0 80 0 76 - 1 46 ng/dL   T3, free    Collection Time: 09/03/19 11:47 AM   Result Value Ref Range    T3, Free 2 03 (L) 2 30 - 4 20 pg/mL       This note was created with voice recognition software    [employer], grammatical and spelling errors may be present within the note as a result

## 2019-10-11 ENCOUNTER — APPOINTMENT (OUTPATIENT)
Dept: LAB | Facility: CLINIC | Age: 84
End: 2019-10-11
Payer: MEDICARE

## 2019-10-11 ENCOUNTER — TRANSCRIBE ORDERS (OUTPATIENT)
Dept: LAB | Facility: CLINIC | Age: 84
End: 2019-10-11

## 2019-10-11 DIAGNOSIS — E03.9 ACQUIRED HYPOTHYROIDISM: Chronic | ICD-10-CM

## 2019-10-11 LAB
T4 FREE SERPL-MCNC: 0.97 NG/DL (ref 0.76–1.46)
TSH SERPL DL<=0.05 MIU/L-ACNC: 10.1 UIU/ML (ref 0.36–3.74)

## 2019-10-11 PROCEDURE — 36415 COLL VENOUS BLD VENIPUNCTURE: CPT

## 2019-10-11 PROCEDURE — 84439 ASSAY OF FREE THYROXINE: CPT

## 2019-10-11 PROCEDURE — 84443 ASSAY THYROID STIM HORMONE: CPT

## 2019-11-01 ENCOUNTER — DOCUMENTATION (OUTPATIENT)
Dept: NEUROLOGY | Facility: CLINIC | Age: 84
End: 2019-11-01

## 2019-11-01 NOTE — PROGRESS NOTES
Type Date User Summary Attachment   General 10/31/2019 11:46 AM Carmine Carmona care coordination  -   Note    Botox- no authorization needed   Please use our stock      Thank you,     Cathy Willard

## 2019-11-15 ENCOUNTER — OFFICE VISIT (OUTPATIENT)
Dept: INTERNAL MEDICINE CLINIC | Facility: CLINIC | Age: 84
End: 2019-11-15
Payer: MEDICARE

## 2019-11-15 ENCOUNTER — APPOINTMENT (OUTPATIENT)
Dept: LAB | Facility: CLINIC | Age: 84
End: 2019-11-15
Payer: MEDICARE

## 2019-11-15 VITALS
HEART RATE: 124 BPM | HEIGHT: 59 IN | RESPIRATION RATE: 14 BRPM | BODY MASS INDEX: 15.12 KG/M2 | WEIGHT: 75 LBS | SYSTOLIC BLOOD PRESSURE: 128 MMHG | DIASTOLIC BLOOD PRESSURE: 90 MMHG | OXYGEN SATURATION: 96 %

## 2019-11-15 DIAGNOSIS — F41.9 ANXIETY: Primary | ICD-10-CM

## 2019-11-15 DIAGNOSIS — E03.9 ACQUIRED HYPOTHYROIDISM: Chronic | ICD-10-CM

## 2019-11-15 LAB
T4 FREE SERPL-MCNC: 0.94 NG/DL (ref 0.76–1.46)
TSH SERPL DL<=0.05 MIU/L-ACNC: 9.65 UIU/ML (ref 0.36–3.74)

## 2019-11-15 PROCEDURE — 99214 OFFICE O/P EST MOD 30 MIN: CPT | Performed by: INTERNAL MEDICINE

## 2019-11-15 PROCEDURE — 84443 ASSAY THYROID STIM HORMONE: CPT

## 2019-11-15 PROCEDURE — 84439 ASSAY OF FREE THYROXINE: CPT

## 2019-11-15 PROCEDURE — 36415 COLL VENOUS BLD VENIPUNCTURE: CPT

## 2019-11-15 RX ORDER — BUSPIRONE HYDROCHLORIDE 5 MG/1
5 TABLET ORAL 2 TIMES DAILY
Qty: 60 TABLET | Refills: 5 | Status: SHIPPED | OUTPATIENT
Start: 2019-11-15

## 2019-11-15 NOTE — PROGRESS NOTES
Assessment/Plan:     Not sure what else to do with her  I have tried various medicines over the years  I told her I think she needs a psychiatrist and therapist   I can try her on a very low dose of BuSpar  I am not sure why she is reacting this way to the levothyroxine  Because she is taking with food, her affective dose is much less than the 25 mcg  She was stable on 50 mcg for many years  Her daughter brought up Xanax but she is 80years old with a significant tremor and would be a high fall risk, among the other possible complications and side effects of that class of medicines  Recheck thyroid levels today  Quality Measures:       Return in about 4 weeks (around 12/13/2019)  No problem-specific Assessment & Plan notes found for this encounter  Diagnoses and all orders for this visit:    Anxiety  -     busPIRone (BUSPAR) 5 mg tablet; Take 1 tablet (5 mg total) by mouth 2 (two) times a day    Acquired hypothyroidism  -     T4, free; Future  -     TSH, 3rd generation; Future          Subjective:      Patient ID: Louise Hernandes is a 80 y o  female  Patient comes in today for follow-up with her daughter  She has been taking the thyroid medicine routinely but she is taking as she describes it 3/4 of a tablet every morning with food  Her TSH is still high at 10  Her T4 is inching upwards  Still low normal   She states she hates taking the thyroid medicine 2 hours after taking the medicine, she states she starts shaking and getting anxious  The rest of the day, she essentially does nothing  But she was also not doing anything when she was not taking the thyroid medicine    She has seen a psychiatrist in the past         ALLERGIES:  Allergies   Allergen Reactions    Metoprolol Nausea Only       CURRENT MEDICATIONS:    Current Outpatient Medications:     Cholecalciferol (VITAMIN D3) 1000 units CAPS, Take 1 capsule by mouth daily, Disp: , Rfl:     gabapentin (NEURONTIN) 100 mg capsule, Take 1 capsule (100 mg total) by mouth 2 (two) times a day (Patient taking differently: Take 100 mg by mouth daily ), Disp: 60 capsule, Rfl: 5    levothyroxine 25 mcg tablet, Take 1 tablet (25 mcg total) by mouth daily in the early morning, Disp: 30 tablet, Rfl: 5    busPIRone (BUSPAR) 5 mg tablet, Take 1 tablet (5 mg total) by mouth 2 (two) times a day, Disp: 60 tablet, Rfl: 5    ACTIVE PROBLEM LIST:  Patient Active Problem List   Diagnosis    Torticollis, spasmodic    Unknown skin lesion    Actinic keratosis    Screening for skin cancer    Basal cell carcinoma of eyebrow    Benign hypertension    Hypothyroidism    Osteoporosis    Anxiety       PAST MEDICAL HISTORY:  Past Medical History:   Diagnosis Date    Hypertension     Right foot drop     Torticollis        PAST SURGICAL HISTORY:  Past Surgical History:   Procedure Laterality Date    TIBIA FRACTURE SURGERY Right 2000    TOTAL HIP ARTHROPLASTY Bilateral        FAMILY HISTORY:  Family History   Problem Relation Age of Onset    Cancer Mother     Heart attack Father         as per [employer]        SOCIAL HISTORY:  Social History     Socioeconomic History    Marital status:      Spouse name: Not on file    Number of children: Not on file    Years of education: Not on file    Highest education level: Not on file   Occupational History    Occupation: retired   Social Needs    Financial resource strain: Not on file    Food insecurity:     Worry: Not on file     Inability: Not on file   [employer] needs:     Medical: Not on file     Non-medical: Not on file   Tobacco Use    Smoking status: Never Smoker    Smokeless tobacco: Never Used    Tobacco comment: former smoker as per [employer]   Substance and Sexual Activity    Alcohol use: No     Comment: occasional use as per [employer]    Drug use: No    Sexual activity: Not on file   Lifestyle    Physical activity:     Days per week: Not on file     Minutes per session: Not on file  Stress: Not on file   Relationships    Social connections:     Talks on phone: Not on file     Gets together: Not on file     Attends Mandaen service: Not on file     Active member of club or organization: Not on file     Attends meetings of clubs or organizations: Not on file     Relationship status: Not on file    Intimate partner violence:     Fear of current or ex partner: Not on file     Emotionally abused: Not on file     Physically abused: Not on file     Forced sexual activity: Not on file   Other Topics Concern    Not on file   Social History Narrative    Caffeine use       Review of Systems   Respiratory: Negative for shortness of breath  Cardiovascular: Negative for chest pain  Objective:  Vitals:    11/15/19 1002   BP: 128/90   BP Location: Left arm   Patient Position: Sitting   Cuff Size: Standard   Pulse: (!) 124   Resp: 14   SpO2: 96%   Weight: 34 kg (75 lb)   Height: 4' 11" (1 499 m)     Body mass index is 15 15 kg/m²  Physical Exam   Constitutional: She is oriented to person, place, and time  She appears well-developed and well-nourished  Neurological: She is alert and oriented to person, place, and time  Nursing note and vitals reviewed  RESULTS:    Recent Results (from the past 1008 hour(s))   TSH, 3rd generation    Collection Time: 10/11/19 11:56 AM   Result Value Ref Range    TSH 3RD GENERATON 10 100 (H) 0 358 - 3 740 uIU/mL   T4, free    Collection Time: 10/11/19 11:56 AM   Result Value Ref Range    Free T4 0 97 0 76 - 1 46 ng/dL       This note was created with voice recognition software  [employer], grammatical and spelling errors may be present within the note as a result

## 2019-11-21 ENCOUNTER — PROCEDURE VISIT (OUTPATIENT)
Dept: NEUROLOGY | Facility: CLINIC | Age: 84
End: 2019-11-21
Payer: MEDICARE

## 2019-11-21 VITALS — SYSTOLIC BLOOD PRESSURE: 126 MMHG | DIASTOLIC BLOOD PRESSURE: 96 MMHG | TEMPERATURE: 98.7 F

## 2019-11-21 DIAGNOSIS — G24.3 TORTICOLLIS, SPASMODIC: Primary | ICD-10-CM

## 2019-11-21 PROCEDURE — 64616 CHEMODENERV MUSC NECK DYSTON: CPT | Performed by: PSYCHIATRY & NEUROLOGY

## 2019-11-21 PROCEDURE — 95874 GUIDE NERV DESTR NEEDLE EMG: CPT | Performed by: PSYCHIATRY & NEUROLOGY

## 2019-11-21 NOTE — PROGRESS NOTES
Chemodenervation  Date/Time: 11/21/2019 1:31 PM  Performed by: Ryanne Fairbanks MD  Authorized by: Ryanne Fairbanks MD     Procedure details:     Guidance: EMG    Post-procedure details:     Chemodenervation:  Neck, excluding muscles of the larynx    Neck Muscle Location[de-identified]  Bilateral neck muscle    Indication:  Torticollis  Three 100 unit vial's of Botox each from lot number  [id number] with an expiration April 2022  were diluted with 2 mL of normal saline to produce a 50 unit per cc dilution  Following aseptic technique a hollow bore 37 mm 27-gauge injectable EMG needle was utilized to identify various muscles in the cervical region  EMG demonstrated rapid firing normal-appearing motor potentials  The following muscles were infiltrated:     Right Splenius Capitis                     50 Units (2 Sites)  Right Trapezius                                 25 Units (2 Sites)  Left Trapezius                                    25 Units (2 Sites)  Left Sternocleidomastoid              100 Units (3 Sites)  Right Sternocleidomastoid            75 units (3 sites)     Total Botox Injected                       275 units  Total Botox wasted                          25 units       Any minimal bleeding was controlled with pressure  The patient tolerated the procedure well with no complications  There maintaining Botox was discarded as no appropriate Medicare patients were being done   Anticipate reinjection in 3 months

## 2019-12-06 DIAGNOSIS — G24.3 TORTICOLLIS, SPASMODIC: ICD-10-CM

## 2019-12-06 RX ORDER — GABAPENTIN 100 MG/1
CAPSULE ORAL
Qty: 60 CAPSULE | Refills: 0 | Status: SHIPPED | OUTPATIENT
Start: 2019-12-06 | End: 2019-12-17 | Stop reason: SDUPTHER

## 2019-12-13 ENCOUNTER — OFFICE VISIT (OUTPATIENT)
Dept: NEUROLOGY | Facility: CLINIC | Age: 84
End: 2019-12-13
Payer: MEDICARE

## 2019-12-13 VITALS
HEIGHT: 58 IN | HEART RATE: 112 BPM | DIASTOLIC BLOOD PRESSURE: 90 MMHG | BODY MASS INDEX: 15.95 KG/M2 | WEIGHT: 76 LBS | SYSTOLIC BLOOD PRESSURE: 126 MMHG

## 2019-12-13 DIAGNOSIS — G24.3 TORTICOLLIS, SPASMODIC: Primary | ICD-10-CM

## 2019-12-13 PROCEDURE — 99213 OFFICE O/P EST LOW 20 MIN: CPT | Performed by: PSYCHIATRY & NEUROLOGY

## 2019-12-13 NOTE — PROGRESS NOTES
Eric Carl is a 80 y o  female returns in follow-up today with history of torticollis status post recent Botox    Assessment:  1  Torticollis, spasmodic        Plan:  Repeat Botox in February  Follow-up afterwards    Discussion:  Yadiel Arce reports her torticollis has been fairly stable with Botox  Anticipate repeating it in February and I will see her in follow-up afterwards      Subjective:    HPI  Yadiel Arce returns in follow-up today  She reports that her torticollis has been under reasonably good control with recent Botox injection  She denies any adverse effects from the injection, specifically no problems with speech swallowing breathing  She anticipates starting bus par for anxiety in the near future      Past Medical History:   Diagnosis Date    Hypertension     Right foot drop     Torticollis        Family History:  Family History   Problem Relation Age of Onset    Cancer Mother     Heart attack Father         as per [employer]        Past Surgical History:  Past Surgical History:   Procedure Laterality Date    TIBIA FRACTURE SURGERY Right 2000    TOTAL HIP ARTHROPLASTY Bilateral        Social History:   reports that she has never smoked  She has never used smokeless tobacco  She reports that she does not drink alcohol or use drugs      Allergies:  Metoprolol      Current Outpatient Medications:     Cholecalciferol (VITAMIN D3) 1000 units CAPS, Take 1 capsule by mouth daily, Disp: , Rfl:     gabapentin (NEURONTIN) 100 mg capsule, take 1 capsule by mouth twice a day, Disp: 60 capsule, Rfl: 0    levothyroxine 25 mcg tablet, Take 1 tablet (25 mcg total) by mouth daily in the early morning, Disp: 30 tablet, Rfl: 5    busPIRone (BUSPAR) 5 mg tablet, Take 1 tablet (5 mg total) by mouth 2 (two) times a day, Disp: 60 tablet, Rfl: 5    I have reviewed the past medical, social and family history, current medications, allergies, vitals, review of systems and updated this information as appropriate today Objective:    Vitals:  Blood pressure 126/90, pulse (!) 112, height 4' 10" (1 473 m), weight 34 5 kg (76 lb)  Physical Exam    Neurological Exam  GENERAL:  Well-developed well-nourished woman in no acute distress  HEENT/NECK: Head is atraumatic normocephalic, neck is supple with minimal side-to-side head tremor with head Center  NEUROLOGIC:  Mental Status: Awake and alert without aphasia  Cranial Nerves: Extraocular movements are full  Face is symmetrical  Coordination:  She ambulates with a steppage gait pattern favoring the right, fairly stable with a straight cane            ROS:    Review of Systems   Constitutional: Positive for appetite change and fatigue  Negative for fever  HENT: Negative  Negative for hearing loss, tinnitus, trouble swallowing and voice change  Eyes: Negative  Negative for photophobia and pain  Respiratory: Negative  Negative for shortness of breath  Cardiovascular: Negative  Negative for palpitations  Gastrointestinal: Positive for nausea (from medication)  Negative for vomiting  Endocrine: Negative for heat intolerance  Genitourinary: Negative  Negative for dysuria, frequency and urgency  Musculoskeletal: Negative  Negative for myalgias and neck pain  Skin: Negative  Negative for rash  Neurological: Positive for tremors and headaches (occasional)  Negative for dizziness, seizures, syncope, facial asymmetry, speech difficulty, weakness, light-headedness and numbness  Hematological: Negative  Does not bruise/bleed easily  Psychiatric/Behavioral: Positive for sleep disturbance (trouble falling asleep)  Negative for confusion and hallucinations

## 2019-12-17 DIAGNOSIS — E03.9 ACQUIRED HYPOTHYROIDISM: Chronic | ICD-10-CM

## 2019-12-17 DIAGNOSIS — G24.3 TORTICOLLIS, SPASMODIC: ICD-10-CM

## 2019-12-17 RX ORDER — LEVOTHYROXINE SODIUM 0.03 MG/1
TABLET ORAL
Qty: 30 TABLET | Refills: 2 | Status: SHIPPED | OUTPATIENT
Start: 2019-12-17 | End: 2020-02-24

## 2019-12-17 RX ORDER — GABAPENTIN 100 MG/1
CAPSULE ORAL
Qty: 60 CAPSULE | Refills: 2 | Status: SHIPPED | OUTPATIENT
Start: 2019-12-17 | End: 2020-03-31

## 2019-12-18 ENCOUNTER — OFFICE VISIT (OUTPATIENT)
Dept: INTERNAL MEDICINE CLINIC | Facility: CLINIC | Age: 84
End: 2019-12-18
Payer: MEDICARE

## 2019-12-18 VITALS
BODY MASS INDEX: 15.12 KG/M2 | WEIGHT: 75 LBS | DIASTOLIC BLOOD PRESSURE: 74 MMHG | OXYGEN SATURATION: 96 % | HEIGHT: 59 IN | HEART RATE: 92 BPM | SYSTOLIC BLOOD PRESSURE: 122 MMHG

## 2019-12-18 DIAGNOSIS — Z00.00 MEDICARE ANNUAL WELLNESS VISIT, SUBSEQUENT: Primary | ICD-10-CM

## 2019-12-18 DIAGNOSIS — G24.3 TORTICOLLIS, SPASMODIC: ICD-10-CM

## 2019-12-18 DIAGNOSIS — E03.9 ACQUIRED HYPOTHYROIDISM: Chronic | ICD-10-CM

## 2019-12-18 PROCEDURE — 99214 OFFICE O/P EST MOD 30 MIN: CPT | Performed by: INTERNAL MEDICINE

## 2019-12-18 PROCEDURE — G0439 PPPS, SUBSEQ VISIT: HCPCS | Performed by: INTERNAL MEDICINE

## 2019-12-18 NOTE — PROGRESS NOTES
Assessment and Plan:     Problem List Items Addressed This Visit     None           Preventive health issues were discussed with patient, and age appropriate screening tests were ordered as noted in patient's After Visit Summary  Personalized health advice and appropriate referrals for health education or preventive services given if needed, as noted in patient's After Visit Summary  History of Present Illness:     Patient presents for Welcome to Medicare visit  Patient Care Team:  Silvana Reyes MD as PCP - General  Prachi Kapadia MD     Review of Systems:     Review of Systems   Respiratory: Negative for shortness of breath  Cardiovascular: Negative for chest pain  Gastrointestinal: Negative for abdominal pain        Problem List:     Patient Active Problem List   Diagnosis    Torticollis, spasmodic    Unknown skin lesion    Actinic keratosis    Screening for skin cancer    Basal cell carcinoma of eyebrow    Benign hypertension    Hypothyroidism    Osteoporosis    Anxiety      Past Medical and Surgical History:     Past Medical History:   Diagnosis Date    Hypertension     Right foot drop     Torticollis      Past Surgical History:   Procedure Laterality Date    TIBIA FRACTURE SURGERY Right 2000    TOTAL HIP ARTHROPLASTY Bilateral       Family History:     Family History   Problem Relation Age of Onset    Cancer Mother     Heart attack Father         as per [employer]       Social History:     Social History     Socioeconomic History    Marital status:      Spouse name: None    Number of children: None    Years of education: None    Highest education level: None   Occupational History    Occupation: retired   Social Needs    Financial resource strain: None    Food insecurity:     Worry: None     Inability: None    Transportation needs:     Medical: None     Non-medical: None   Tobacco Use    Smoking status: Never Smoker    Smokeless tobacco: Never Used    Tobacco comment: former smoker as per [employer]   Substance and Sexual Activity    Alcohol use: No     Comment: occasional use as per [employer]    Drug use: No    Sexual activity: None   Lifestyle    Physical activity:     Days per week: None     Minutes per session: None    Stress: None   Relationships    Social connections:     Talks on phone: None     Gets together: None     Attends Anabaptism service: None     Active member of club or organization: None     Attends meetings of clubs or organizations: None     Relationship status: None    Intimate partner violence:     Fear of current or ex partner: None     Emotionally abused: None     Physically abused: None     Forced sexual activity: None   Other Topics Concern    None   Social History Narrative    Caffeine use      Medications and Allergies:     Current Outpatient Medications   Medication Sig Dispense Refill    Cholecalciferol (VITAMIN D3) 1000 units CAPS Take 1 capsule by mouth daily      gabapentin (NEURONTIN) 100 mg capsule take 1 capsule by mouth twice a day 60 capsule 2    levothyroxine 25 mcg tablet TAKE 1 TABLET ONCE DAILY IN THE EARLY MORNING 30 tablet 2    busPIRone (BUSPAR) 5 mg tablet Take 1 tablet (5 mg total) by mouth 2 (two) times a day (Patient not taking: Reported on 12/18/2019) 60 tablet 5     No current facility-administered medications for this visit  Allergies   Allergen Reactions    Metoprolol Nausea Only      Immunizations:     Immunization History   Administered Date(s) Administered    Influenza Quadrivalent Preservative Free 3 years and older IM 02/15/2017    Influenza Split High Dose Preservative Free IM 10/27/2015, 11/01/2017    Influenza TIV (IM) 11/13/2014    Influenza, high dose seasonal 0 5 mL 11/27/2018      Health Maintenance: There are no preventive care reminders to display for this patient        Topic Date Due    DTaP,Tdap,and Td Vaccines (1 - Tdap) 10/27/1945    Influenza Vaccine  07/01/2019 Medicare Screening Tests and Risk Assessments:     Juan M Caceres is here for her Subsequent Wellness visit  Health Risk Assessment:   Patient rates overall health as fair  Patient feels that their physical health rating is slightly worse  Eyesight was rated as slightly worse  Hearing was rated as same  Pain experienced in the last 7 days has been some  Patient states that she has experienced no weight loss or gain in last 6 months  Depression Screening:   PHQ-2 Score: 2      Fall Risk Screening: In the past year, patient has experienced: no history of falling in past year      Urinary Incontinence Screening:   Patient has not leaked urine accidently in the last six months  Home Safety:  Patient has trouble with stairs inside or outside of their home  Patient has working smoke alarms and has working carbon monoxide detector  Home safety hazards include: none  Nutrition:   Current diet is Regular  Medications:   Patient is currently taking over-the-counter supplements  OTC medications include: see medication list  Patient is able to manage medications       Activities of Daily Living (ADLs)/Instrumental Activities of Daily Living (IADLs):   Walk and transfer into and out of bed and chair?: Yes  Dress and groom yourself?: Yes    Bathe or shower yourself?: Yes    Manage your money, pay your bills and track your expenses?: Yes    Previous Hospitalizations:   Any hospitalizations or ED visits within the last 12 months?: No      Advance Care Planning:   Living will: No    Durable POA for healthcare: No    Advanced directive: No    Advanced directive counseling given: Yes    Five wishes given: Yes      Cognitive Screening:   Provider or family/friend/caregiver concerned regarding cognition?: No    PREVENTIVE SCREENINGS      Cardiovascular Screening:    General: Screening Current      Diabetes Screening:     General: Screening Current      Colorectal Cancer Screening:     General: Screening Not Indicated Breast Cancer Screening:     General: Screening Not Indicated      Cervical Cancer Screening:    General: Screening Not Indicated      Osteoporosis Screening:    General: Screening Not Indicated and History Osteoporosis      Abdominal Aortic Aneurysm (AAA) Screening:        General: Screening Not Indicated      Lung Cancer Screening:     General: Screening Not Indicated      Hepatitis C Screening:    General: Screening Not Indicated    No exam data present     Physical Exam:     /74 (BP Location: Right arm, Patient Position: Sitting, Cuff Size: Adult)   Pulse 92   Ht 4' 11" (1 499 m)   Wt 34 kg (75 lb)   SpO2 96%   BMI 15 15 kg/m²     Physical Exam    Juliana Gómez MD

## 2019-12-18 NOTE — PATIENT INSTRUCTIONS
Medicare Preventive Visit Patient Instructions  Thank you for completing your Welcome to Medicare Visit or Medicare Annual Wellness Visit today  Your next wellness visit will be due in one year (12/18/2020)  The screening/preventive services that you may require over the next 5-10 years are detailed below  Some tests may not apply to you based off risk factors and/or age  Screening tests ordered at today's visit but not completed yet may show as past due  Also, please note that scanned in results may not display below  Preventive Screenings:  Service Recommendations Previous Testing/Comments   Colorectal Cancer Screening  * Colonoscopy    * Fecal Occult Blood Test (FOBT)/Fecal Immunochemical Test (FIT)  * Fecal DNA/Cologuard Test  * Flexible Sigmoidoscopy Age: 54-65 years old   Colonoscopy: every 10 years (may be performed more frequently if at higher risk)  OR  FOBT/FIT: every 1 year  OR  Cologuard: every 3 years  OR  Sigmoidoscopy: every 5 years  Screening may be recommended earlier than age 48 if at higher risk for colorectal cancer  Also, an individualized decision between you and your healthcare provider will decide whether screening between the ages of 74-80 would be appropriate  Colonoscopy: Not on file  FOBT/FIT: Not on file  Cologuard: Not on file  Sigmoidoscopy: Not on file    Screening Not Indicated     Breast Cancer Screening Age: 36 years old  Frequency: every 1-2 years  Not required if history of left and right mastectomy Mammogram: Not on file       Cervical Cancer Screening Between the ages of 21-29, pap smear recommended once every 3 years  Between the ages of 33-67, can perform pap smear with HPV co-testing every 5 years     Recommendations may differ for women with a history of total hysterectomy, cervical cancer, or abnormal pap smears in past  Pap Smear: Not on file    Screening Not Indicated   Hepatitis C Screening Once for adults born between 1945 and 1965  More frequently in patients at high risk for Hepatitis C Hep C Antibody: Not on file       Diabetes Screening 1-2 times per year if you're at risk for diabetes or have pre-diabetes Fasting glucose: 91 mg/dL   A1C: No results in last 5 years    Screening Current   Cholesterol Screening Once every 5 years if you don't have a lipid disorder  May order more often based on risk factors  Lipid panel: 02/01/2016    Screening Current     Other Preventive Screenings Covered by Medicare:  1  Abdominal Aortic Aneurysm (AAA) Screening: covered once if your at risk  You're considered to be at risk if you have a family history of AAA  2  Lung Cancer Screening: covers low dose CT scan once per year if you meet all of the following conditions: (1) Age 50-69; (2) No signs or symptoms of lung cancer; (3) Current smoker or have quit smoking within the last 15 years; (4) You have a tobacco smoking history of at least 30 pack years (packs per day multiplied by number of years you smoked); (5) You get a written order from a healthcare provider  3  Glaucoma Screening: covered annually if you're considered high risk: (1) You have diabetes OR (2) Family history of glaucoma OR (3)  aged 48 and older OR (3)  American aged 72 and older  3  Osteoporosis Screening: covered every 2 years if you meet one of the following conditions: (1) You're estrogen deficient and at risk for osteoporosis based off medical history and other findings; (2) Have a vertebral abnormality; (3) On glucocorticoid therapy for more than 3 months; (4) Have primary hyperparathyroidism; (5) On osteoporosis medications and need to assess response to drug therapy  · Last bone density test (DXA Scan): 07/27/2016   5  HIV Screening: covered annually if you're between the age of 15-65  Also covered annually if you are younger than 13 and older than 72 with risk factors for HIV infection   For pregnant patients, it is covered up to 3 times per pregnancy  Immunizations:  Immunization Recommendations   Influenza Vaccine Annual influenza vaccination during flu season is recommended for all persons aged >= 6 months who do not have contraindications   Pneumococcal Vaccine (Prevnar and Pneumovax)  * Prevnar = PCV13  * Pneumovax = PPSV23   Adults 25-60 years old: 1-3 doses may be recommended based on certain risk factors  Adults 72 years old: Prevnar (PCV13) vaccine recommended followed by Pneumovax (PPSV23) vaccine  If already received PPSV23 since turning 65, then PCV13 recommended at least one year after PPSV23 dose  Hepatitis B Vaccine 3 dose series if at intermediate or high risk (ex: diabetes, end stage renal disease, liver disease)   Tetanus (Td) Vaccine - COST NOT COVERED BY MEDICARE PART B Following completion of primary series, a booster dose should be given every 10 years to maintain immunity against tetanus  Td may also be given as tetanus wound prophylaxis  Tdap Vaccine - COST NOT COVERED BY MEDICARE PART B Recommended at least once for all adults  For pregnant patients, recommended with each pregnancy  Shingles Vaccine (Shingrix) - COST NOT COVERED BY MEDICARE PART B  2 shot series recommended in those aged 48 and above     Health Maintenance Due:  There are no preventive care reminders to display for this patient  Immunizations Due:      Topic Date Due    DTaP,Tdap,and Td Vaccines (1 - Tdap) 10/27/1945    Influenza Vaccine  07/01/2019     Advance Directives   What are advance directives? Advance directives are legal documents that state your wishes and plans for medical care  These plans are made ahead of time in case you lose your ability to make decisions for yourself  Advance directives can apply to any medical decision, such as the treatments you want, and if you want to donate organs  What are the types of advance directives? There are many types of advance directives, and each state has rules about how to use them   You may choose a combination of any of the following:  · Living will: This is a written record of the treatment you want  You can also choose which treatments you do not want, which to limit, and which to stop at a certain time  This includes surgery, medicine, IV fluid, and tube feedings  · Durable power of  for healthcare [facility]): This is a written record that states who you want to make healthcare choices for you when you are unable to make them for yourself  This person, called a proxy, is usually a family member or a friend  You may choose more than 1 proxy  · Do not resuscitate (DNR) order:  A DNR order is used in case your heart stops beating or you stop breathing  It is a request not to have certain forms of treatment, such as CPR  A DNR order may be included in other types of advance directives  · Medical directive: This covers the care that you want if you are in a coma, near death, or unable to make decisions for yourself  You can list the treatments you want for each condition  Treatment may include pain medicine, surgery, blood transfusions, dialysis, IV or tube feedings, and a ventilator (breathing machine)  · Values history: This document has questions about your views, beliefs, and how you feel and think about life  This information can help others choose the care that you would choose  Why are advance directives important? An advance directive helps you control your care  Although spoken wishes may be used, it is better to have your wishes written down  Spoken wishes can be misunderstood, or not followed  Treatments may be given even if you do not want them  An advance directive may make it easier for your family to make difficult choices about your care  Underweight  Underweight is defined as having a body mass index (BMI) of less than 18 5 kg/m2   Anorexia  is a loss of appetite, decreased food intake, or both  Your appetite naturally decreases as you get older   You also get full faster than you used to  This occurs because your body needs less energy  Other body changes can also lead to a decreased appetite  Even though some appetite loss is normal, you still need to get enough calories and nutrients to keep you healthy  You can start to lose too much weight if you do not eat as much food as your body needs  Unwanted weight loss can cause health problems, or worsen health problems you already have  You can also become dehydrated if you do not drink enough liquid  How to eat healthy and get enough nutrients:   · Choose healthy foods  Eat a variety of fruits, vegetables, whole grains, low-fat dairy foods, lean meats, and other protein foods  Limit foods high in fat, sugar, and salt  Limit or avoid alcohol as directed  Work with a dietitian to help you plan your meals if you need to follow a special diet  A dietitian can also teach you how to modify foods if you have trouble chewing or swallowing  · Snack on healthy foods between meals  if you only eat a small amount during meals  Snacks provide extra healthy nutrients and calories between meals  Examples include fruit, cheese, and whole grain crackers  · Drink liquids as directed  to avoid dehydration  Drink liquids between meals if they cause you to get full too quickly during meals  Ask how much liquid to drink each day and which liquids are best for you  · Use herbs, spices, and flavor enhancers to add flavor to foods  Avoid using herbs and spice blends that also contain sodium  Ask your healthcare provider or dietitian about flavor enhancers  Flavor enhancers with ham, natural taylor, and roast beef flavors can also be sprinkled on food to add flavor  · Share meals with others as often as you can  Eating with others may help you to eat better during meal time  Ask family members, neighbors, or friends to join you for lunch  There are also senior centers where you can meet people, and share meals with them     · Ask family and friends for help  with shopping or preparing foods  Ask for a ride to the grocery store, if needed  © Copyright [employer] 2018 Information is for End User's use only and may not be sold, redistributed or otherwise used for commercial purposes  All illustrations and images included in CareNotes® are the copyrighted property of [employer].  or [facility] Preventive Visit Patient Instructions  Thank you for completing your Welcome to Medicare Visit or Medicare Annual Wellness Visit today  Your next wellness visit will be due in one year (12/18/2020)  The screening/preventive services that you may require over the next 5-10 years are detailed below  Some tests may not apply to you based off risk factors and/or age  Screening tests ordered at today's visit but not completed yet may show as past due  Also, please note that scanned in results may not display below  Preventive Screenings:  Service Recommendations Previous Testing/Comments   Colorectal Cancer Screening  * Colonoscopy    * Fecal Occult Blood Test (FOBT)/Fecal Immunochemical Test (FIT)  * Fecal DNA/Cologuard Test  * Flexible Sigmoidoscopy Age: 54-65 years old   Colonoscopy: every 10 years (may be performed more frequently if at higher risk)  OR  FOBT/FIT: every 1 year  OR  Cologuard: every 3 years  OR  Sigmoidoscopy: every 5 years  Screening may be recommended earlier than age 48 if at higher risk for colorectal cancer  Also, an individualized decision between you and your healthcare provider will decide whether screening between the ages of 74-80 would be appropriate   Colonoscopy: Not on file  FOBT/FIT: Not on file  Cologuard: Not on file  Sigmoidoscopy: Not on file    Screening Not Indicated     Breast Cancer Screening Age: 36 years old  Frequency: every 1-2 years  Not required if history of left and right mastectomy Mammogram: Not on file       Cervical Cancer Screening Between the ages of 21-29, pap smear recommended once every 3 years  Between the ages of 33-67, can perform pap smear with HPV co-testing every 5 years  Recommendations may differ for women with a history of total hysterectomy, cervical cancer, or abnormal pap smears in past  Pap Smear: Not on file    Screening Not Indicated   Hepatitis C Screening Once for adults born between 1945 and 1965  More frequently in patients at high risk for Hepatitis C Hep C Antibody: Not on file       Diabetes Screening 1-2 times per year if you're at risk for diabetes or have pre-diabetes Fasting glucose: 91 mg/dL   A1C: No results in last 5 years    Screening Current   Cholesterol Screening Once every 5 years if you don't have a lipid disorder  May order more often based on risk factors  Lipid panel: 02/01/2016    Screening Current     Other Preventive Screenings Covered by Medicare:  6  Abdominal Aortic Aneurysm (AAA) Screening: covered once if your at risk  You're considered to be at risk if you have a family history of AAA  7  Lung Cancer Screening: covers low dose CT scan once per year if you meet all of the following conditions: (1) Age 50-69; (2) No signs or symptoms of lung cancer; (3) Current smoker or have quit smoking within the last 15 years; (4) You have a tobacco smoking history of at least 30 pack years (packs per day multiplied by number of years you smoked); (5) You get a written order from a healthcare provider  8  Glaucoma Screening: covered annually if you're considered high risk: (1) You have diabetes OR (2) Family history of glaucoma OR (3)  aged 48 and older OR (3)  American aged 72 and older  5   Osteoporosis Screening: covered every 2 years if you meet one of the following conditions: (1) You're estrogen deficient and at risk for osteoporosis based off medical history and other findings; (2) Have a vertebral abnormality; (3) On glucocorticoid therapy for more than 3 months; (4) Have primary hyperparathyroidism; (5) On osteoporosis medications and need to assess response to drug therapy  · Last bone density test (DXA Scan): 07/27/2016   10  HIV Screening: covered annually if you're between the age of 15-65  Also covered annually if you are younger than 13 and older than 72 with risk factors for HIV infection  For pregnant patients, it is covered up to 3 times per pregnancy  Immunizations:  Immunization Recommendations   Influenza Vaccine Annual influenza vaccination during flu season is recommended for all persons aged >= 6 months who do not have contraindications   Pneumococcal Vaccine (Prevnar and Pneumovax)  * Prevnar = PCV13  * Pneumovax = PPSV23   Adults 25-60 years old: 1-3 doses may be recommended based on certain risk factors  Adults 72 years old: Prevnar (PCV13) vaccine recommended followed by Pneumovax (PPSV23) vaccine  If already received PPSV23 since turning 65, then PCV13 recommended at least one year after PPSV23 dose  Hepatitis B Vaccine 3 dose series if at intermediate or high risk (ex: diabetes, end stage renal disease, liver disease)   Tetanus (Td) Vaccine - COST NOT COVERED BY MEDICARE PART B Following completion of primary series, a booster dose should be given every 10 years to maintain immunity against tetanus  Td may also be given as tetanus wound prophylaxis  Tdap Vaccine - COST NOT COVERED BY MEDICARE PART B Recommended at least once for all adults  For pregnant patients, recommended with each pregnancy  Shingles Vaccine (Shingrix) - COST NOT COVERED BY MEDICARE PART B  2 shot series recommended in those aged 48 and above     Health Maintenance Due:  There are no preventive care reminders to display for this patient  Immunizations Due:      Topic Date Due    DTaP,Tdap,and Td Vaccines (1 - Tdap) 10/27/1945    Influenza Vaccine  07/01/2019     Advance Directives   What are advance directives? Advance directives are legal documents that state your wishes and plans for medical care   These plans are made ahead of time in case you lose your ability to make decisions for yourself  Advance directives can apply to any medical decision, such as the treatments you want, and if you want to donate organs  What are the types of advance directives? There are many types of advance directives, and each state has rules about how to use them  You may choose a combination of any of the following:  · Living will: This is a written record of the treatment you want  You can also choose which treatments you do not want, which to limit, and which to stop at a certain time  This includes surgery, medicine, IV fluid, and tube feedings  · Durable power of  for healthcare [place] [facility]): This is a written record that states who you want to make healthcare choices for you when you are unable to make them for yourself  This person, called a proxy, is usually a family member or a friend  You may choose more than 1 proxy  · Do not resuscitate (DNR) order:  A DNR order is used in case your heart stops beating or you stop breathing  It is a request not to have certain forms of treatment, such as CPR  A DNR order may be included in other types of advance directives  · Medical directive: This covers the care that you want if you are in a coma, near death, or unable to make decisions for yourself  You can list the treatments you want for each condition  Treatment may include pain medicine, surgery, blood transfusions, dialysis, IV or tube feedings, and a ventilator (breathing machine)  · Values history: This document has questions about your views, beliefs, and how you feel and think about life  This information can help others choose the care that you would choose  Why are advance directives important? An advance directive helps you control your care  Although spoken wishes may be used, it is better to have your wishes written down  Spoken wishes can be misunderstood, or not followed   Treatments may be given even if you do not want them  An advance directive may make it easier for your family to make difficult choices about your care  Underweight  Underweight is defined as having a body mass index (BMI) of less than 18 5 kg/m2   Anorexia  is a loss of appetite, decreased food intake, or both  Your appetite naturally decreases as you get older  You also get full faster than you used to  This occurs because your body needs less energy  Other body changes can also lead to a decreased appetite  Even though some appetite loss is normal, you still need to get enough calories and nutrients to keep you healthy  You can start to lose too much weight if you do not eat as much food as your body needs  Unwanted weight loss can cause health problems, or worsen health problems you already have  You can also become dehydrated if you do not drink enough liquid  How to eat healthy and get enough nutrients:   · Choose healthy foods  Eat a variety of fruits, vegetables, whole grains, low-fat dairy foods, lean meats, and other protein foods  Limit foods high in fat, sugar, and salt  Limit or avoid alcohol as directed  Work with a dietitian to help you plan your meals if you need to follow a special diet  A dietitian can also teach you how to modify foods if you have trouble chewing or swallowing  · Snack on healthy foods between meals  if you only eat a small amount during meals  Snacks provide extra healthy nutrients and calories between meals  Examples include fruit, cheese, and whole grain crackers  · Drink liquids as directed  to avoid dehydration  Drink liquids between meals if they cause you to get full too quickly during meals  Ask how much liquid to drink each day and which liquids are best for you  · Use herbs, spices, and flavor enhancers to add flavor to foods  Avoid using herbs and spice blends that also contain sodium  Ask your healthcare provider or dietitian about flavor enhancers   Flavor enhancers with ham, natural taylor, and roast beef flavors can also be sprinkled on food to add flavor  · Share meals with others as often as you can  Eating with others may help you to eat better during meal time  Ask family members, neighbors, or friends to join you for lunch  There are also senior centers where you can meet people, and share meals with them  · Ask family and friends for help  with shopping or preparing foods  Ask for a ride to the grocery store, if needed  © Copyright [employer] 2018 Information is for End User's use only and may not be sold, redistributed or otherwise used for commercial purposes   All illustrations and images included in CareNotes® are the copyrighted property of [employer]  or [address] [place] [address]

## 2019-12-18 NOTE — PROGRESS NOTES
Assessment/Plan:     Her daughter and I again implored her to please take the [address]  We also asked her to take a little more of the thyroid medicine  Her numbers are improving but she needs a slightly higher dose  Continue follow-up with Neurology  Quality Measures:       Return in about 4 months (around 4/18/2020)  No problem-specific Assessment & Plan notes found for this encounter  Diagnoses and all orders for this visit:    Medicare annual wellness visit, subsequent    Acquired hypothyroidism  -     T4, free; Future  -     TSH, 3rd generation; Future    Torticollis, spasmodic          Subjective:      Patient ID: Rosalio Louise is a 80 y o  female  Patient comes in today for follow-up and Medicare wellness  She continues to take the small amount of thyroid medicine which she still feels makes her sick for 4 hours period but her levels are improving  She did not start the BuSpar  I was expecting that  She did  the prescription  She states she is planning to started  Her daughter has been working on her  They do have an aide working with her at home  Her daughter is hoping the distraction will help        ALLERGIES:  Allergies   Allergen Reactions    Metoprolol Nausea Only       CURRENT MEDICATIONS:    Current Outpatient Medications:     Cholecalciferol (VITAMIN D3) 1000 units CAPS, Take 1 capsule by mouth daily, Disp: , Rfl:     gabapentin (NEURONTIN) 100 mg capsule, take 1 capsule by mouth twice a day, Disp: 60 capsule, Rfl: 2    levothyroxine 25 mcg tablet, TAKE 1 TABLET ONCE DAILY IN THE EARLY MORNING, Disp: 30 tablet, Rfl: 2    busPIRone (BUSPAR) 5 mg tablet, Take 1 tablet (5 mg total) by mouth 2 (two) times a day (Patient not taking: Reported on 12/18/2019), Disp: 60 tablet, Rfl: 5    ACTIVE PROBLEM LIST:  Patient Active Problem List   Diagnosis    Torticollis, spasmodic    Unknown skin lesion    Actinic keratosis    Screening for skin cancer    Basal cell carcinoma of eyebrow    Benign hypertension    Hypothyroidism    Osteoporosis    Anxiety       PAST MEDICAL HISTORY:  Past Medical History:   Diagnosis Date    Hypertension     Right foot drop     Torticollis        PAST SURGICAL HISTORY:  Past Surgical History:   Procedure Laterality Date    TIBIA FRACTURE SURGERY Right 2000    TOTAL HIP ARTHROPLASTY Bilateral        FAMILY HISTORY:  Family History   Problem Relation Age of Onset    Cancer Mother     Heart attack Father         as per [employer]        SOCIAL HISTORY:  Social History     Socioeconomic History    Marital status:      Spouse name: Not on file    Number of children: Not on file    Years of education: Not on file    Highest education level: Not on file   Occupational History    Occupation: retired   Social Needs    Financial resource strain: Not on file    Food insecurity:     Worry: Not on file     Inability: Not on file   [employer] needs:     Medical: Not on file     Non-medical: Not on file   Tobacco Use    Smoking status: Never Smoker    Smokeless tobacco: Never Used    Tobacco comment: former smoker as per [employer]   Substance and Sexual Activity    Alcohol use: No     Comment: occasional use as per [employer]    Drug use: No    Sexual activity: Not on file   Lifestyle    Physical activity:     Days per week: Not on file     Minutes per session: Not on file    Stress: Not on file   Relationships    Social connections:     Talks on phone: Not on file     Gets together: Not on file     Attends Christian service: Not on file     Active member of club or organization: Not on file     Attends meetings of clubs or organizations: Not on file     Relationship status: Not on file    Intimate partner violence:     Fear of current or ex partner: Not on file     Emotionally abused: Not on file     Physically abused: Not on file     Forced sexual activity: Not on file   Other Topics Concern    Not on file   Social History Narrative    Caffeine use       Review of Systems   Respiratory: Negative for shortness of breath  Cardiovascular: Negative for chest pain  Gastrointestinal: Negative for abdominal pain  Objective:  Vitals:    12/18/19 1319   BP: 122/74   BP Location: Right arm   Patient Position: Sitting   Cuff Size: Adult   Pulse: 92   SpO2: 96%   Weight: 34 kg (75 lb)   Height: 4' 11" (1 499 m)     Body mass index is 15 15 kg/m²  Physical Exam   Constitutional: She is oriented to person, place, and time  She appears well-developed and well-nourished  Cardiovascular: Normal rate, regular rhythm and normal heart sounds  Pulmonary/Chest: Effort normal and breath sounds normal    Abdominal: Soft  There is no tenderness  Neurological: She is alert and oriented to person, place, and time  Nursing note and vitals reviewed  RESULTS:    Recent Results (from the past 1008 hour(s))   T4, free    Collection Time: 11/15/19 10:58 AM   Result Value Ref Range    Free T4 0 94 0 76 - 1 46 ng/dL   TSH, 3rd generation    Collection Time: 11/15/19 10:58 AM   Result Value Ref Range    TSH 3RD GENERATON 9 650 (H) 0 358 - 3 740 uIU/mL       This note was created with voice recognition software  Phonic, grammatical and spelling errors may be present within the note as a result

## 2020-01-31 ENCOUNTER — DOCUMENTATION (OUTPATIENT)
Dept: NEUROLOGY | Facility: CLINIC | Age: 85
End: 2020-01-31

## 2020-01-31 NOTE — PROGRESS NOTES
General 01/29/2020  3:14 PM Jemal Hutton MA CARE COORDINATION -   Note    BOTOX 300 UNITS - NO AUTH NEEDED    STOCK

## 2020-02-14 ENCOUNTER — TELEPHONE (OUTPATIENT)
Dept: INTERNAL MEDICINE CLINIC | Facility: CLINIC | Age: 85
End: 2020-02-14

## 2020-02-14 DIAGNOSIS — G24.3 SPASMODIC TORTICOLLIS: Primary | ICD-10-CM

## 2020-02-14 NOTE — TELEPHONE ENCOUNTER
Per [facility], patient needs a referral   Diagnosis code G24 3    Any questions call back #[phone]

## 2020-02-22 DIAGNOSIS — E03.9 ACQUIRED HYPOTHYROIDISM: Chronic | ICD-10-CM

## 2020-02-24 RX ORDER — LEVOTHYROXINE SODIUM 0.03 MG/1
TABLET ORAL
Qty: 30 TABLET | Refills: 5 | Status: SHIPPED | OUTPATIENT
Start: 2020-02-24 | End: 2021-02-22

## 2020-03-12 ENCOUNTER — PROCEDURE VISIT (OUTPATIENT)
Dept: NEUROLOGY | Facility: CLINIC | Age: 85
End: 2020-03-12
Payer: MEDICARE

## 2020-03-12 DIAGNOSIS — G24.3 TORTICOLLIS, SPASMODIC: Primary | ICD-10-CM

## 2020-03-12 PROCEDURE — 64616 CHEMODENERV MUSC NECK DYSTON: CPT | Performed by: PSYCHIATRY & NEUROLOGY

## 2020-03-12 PROCEDURE — 95874 GUIDE NERV DESTR NEEDLE EMG: CPT | Performed by: PSYCHIATRY & NEUROLOGY

## 2020-03-12 NOTE — PROGRESS NOTES
Chemodenervation  Date/Time: 3/12/2020 2:01 PM  Performed by: Herb Farmer MD  Authorized by: Herb Farmer MD     Procedure details:     Guidance: EMG    Post-procedure details:     Chemodenervation:  Neck, excluding muscles of the larynx    Neck Muscle Location[de-identified]  Bilateral neck muscle    Indication:  Torticollis  Three 100 unit vial's of Botox each from lot number  [id number] with an expiration May 2022  were diluted with 2 mL of normal saline to produce a 50 unit per cc dilution  Following aseptic technique a hollow bore 37 mm 27-gauge injectable EMG needle was utilized to identify various muscles in the cervical region  EMG demonstrated rapid firing normal-appearing motor potentials  The following muscles were infiltrated:     Right Splenius Capitis           50 Units (2 Sites)  Right Trapezius                     25 Units (2 Sites)  Left Trapezius                        25 Units (2 Sites)  Left Sternocleidomastoid      100 Units (3 Sites)  Right Sternocleidomastoid     75 units (3 sites)     Total Botox Injected                       275 units  Total Botox wasted                          25 units       Any minimal bleeding was controlled with pressure  The patient tolerated the procedure well with no complications  There maintaining Botox was discarded as no appropriate Medicare patients were being done   Anticipate reinjection in 3 months

## 2020-03-31 DIAGNOSIS — G24.3 TORTICOLLIS, SPASMODIC: ICD-10-CM

## 2020-03-31 RX ORDER — GABAPENTIN 100 MG/1
CAPSULE ORAL
Qty: 60 CAPSULE | Refills: 2 | Status: SHIPPED | OUTPATIENT
Start: 2020-03-31 | End: 2020-11-25

## 2020-04-21 ENCOUNTER — TELEPHONE (OUTPATIENT)
Dept: INTERNAL MEDICINE CLINIC | Facility: CLINIC | Age: 85
End: 2020-04-21

## 2020-04-23 ENCOUNTER — TELEMEDICINE (OUTPATIENT)
Dept: INTERNAL MEDICINE CLINIC | Facility: CLINIC | Age: 85
End: 2020-04-23
Payer: MEDICARE

## 2020-04-23 DIAGNOSIS — E03.9 ACQUIRED HYPOTHYROIDISM: Chronic | ICD-10-CM

## 2020-04-23 DIAGNOSIS — F41.9 ANXIETY: Primary | ICD-10-CM

## 2020-04-23 PROCEDURE — 99442 PR PHYS/QHP TELEPHONE EVALUATION 11-20 MIN: CPT | Performed by: INTERNAL MEDICINE

## 2020-05-08 ENCOUNTER — TELEPHONE (OUTPATIENT)
Dept: INTERNAL MEDICINE CLINIC | Facility: CLINIC | Age: 85
End: 2020-05-08

## 2020-05-15 ENCOUNTER — DOCUMENTATION (OUTPATIENT)
Dept: NEUROLOGY | Facility: CLINIC | Age: 85
End: 2020-05-15

## 2020-05-19 ENCOUNTER — TELEMEDICINE (OUTPATIENT)
Dept: INTERNAL MEDICINE CLINIC | Facility: CLINIC | Age: 85
End: 2020-05-19
Payer: MEDICARE

## 2020-05-19 VITALS — HEIGHT: 59 IN | BODY MASS INDEX: 15.15 KG/M2

## 2020-05-19 DIAGNOSIS — R11.0 NAUSEA: ICD-10-CM

## 2020-05-19 DIAGNOSIS — E03.9 ACQUIRED HYPOTHYROIDISM: Chronic | ICD-10-CM

## 2020-05-19 DIAGNOSIS — F41.9 ANXIETY: ICD-10-CM

## 2020-05-19 DIAGNOSIS — R10.13 EPIGASTRIC PAIN: Primary | ICD-10-CM

## 2020-05-19 PROCEDURE — 99214 OFFICE O/P EST MOD 30 MIN: CPT | Performed by: INTERNAL MEDICINE

## 2020-05-19 RX ORDER — PANTOPRAZOLE SODIUM 20 MG/1
20 TABLET, DELAYED RELEASE ORAL
Qty: 30 TABLET | Refills: 1 | Status: SHIPPED | OUTPATIENT
Start: 2020-05-19 | End: 2020-09-09 | Stop reason: SDDI

## 2020-05-26 ENCOUNTER — TELEPHONE (OUTPATIENT)
Dept: INTERNAL MEDICINE CLINIC | Facility: CLINIC | Age: 85
End: 2020-05-26

## 2020-05-29 ENCOUNTER — TELEPHONE (OUTPATIENT)
Dept: INTERNAL MEDICINE CLINIC | Facility: CLINIC | Age: 85
End: 2020-05-29

## 2020-06-05 ENCOUNTER — TELEPHONE (OUTPATIENT)
Dept: INTERNAL MEDICINE CLINIC | Facility: CLINIC | Age: 85
End: 2020-06-05

## 2020-06-09 ENCOUNTER — OFFICE VISIT (OUTPATIENT)
Dept: INTERNAL MEDICINE CLINIC | Facility: CLINIC | Age: 85
End: 2020-06-09
Payer: MEDICARE

## 2020-06-09 VITALS
BODY MASS INDEX: 14.84 KG/M2 | OXYGEN SATURATION: 92 % | SYSTOLIC BLOOD PRESSURE: 132 MMHG | DIASTOLIC BLOOD PRESSURE: 76 MMHG | HEIGHT: 59 IN | WEIGHT: 73.6 LBS | TEMPERATURE: 98 F | HEART RATE: 131 BPM

## 2020-06-09 DIAGNOSIS — E03.9 ACQUIRED HYPOTHYROIDISM: Chronic | ICD-10-CM

## 2020-06-09 DIAGNOSIS — F41.9 ANXIETY: Primary | ICD-10-CM

## 2020-06-09 PROCEDURE — 3008F BODY MASS INDEX DOCD: CPT | Performed by: INTERNAL MEDICINE

## 2020-06-09 PROCEDURE — 3078F DIAST BP <80 MM HG: CPT | Performed by: INTERNAL MEDICINE

## 2020-06-09 PROCEDURE — 1160F RVW MEDS BY RX/DR IN RCRD: CPT | Performed by: INTERNAL MEDICINE

## 2020-06-09 PROCEDURE — 3075F SYST BP GE 130 - 139MM HG: CPT | Performed by: INTERNAL MEDICINE

## 2020-06-09 PROCEDURE — 1036F TOBACCO NON-USER: CPT | Performed by: INTERNAL MEDICINE

## 2020-06-09 PROCEDURE — 99214 OFFICE O/P EST MOD 30 MIN: CPT | Performed by: INTERNAL MEDICINE

## 2020-06-09 RX ORDER — ESCITALOPRAM OXALATE 5 MG/1
5 TABLET ORAL DAILY
Qty: 30 TABLET | Refills: 5 | Status: SHIPPED | OUTPATIENT
Start: 2020-06-09

## 2020-06-12 ENCOUNTER — APPOINTMENT (OUTPATIENT)
Dept: LAB | Facility: CLINIC | Age: 85
End: 2020-06-12
Payer: MEDICARE

## 2020-06-12 DIAGNOSIS — F41.9 ANXIETY: ICD-10-CM

## 2020-06-12 DIAGNOSIS — E03.9 ACQUIRED HYPOTHYROIDISM: Chronic | ICD-10-CM

## 2020-06-12 LAB
ALBUMIN SERPL BCP-MCNC: 3.3 G/DL (ref 3.5–5)
ALP SERPL-CCNC: 143 U/L (ref 46–116)
ALT SERPL W P-5'-P-CCNC: 21 U/L (ref 12–78)
AMYLASE SERPL-CCNC: 98 IU/L (ref 25–115)
ANION GAP SERPL CALCULATED.3IONS-SCNC: 7 MMOL/L (ref 4–13)
AST SERPL W P-5'-P-CCNC: 20 U/L (ref 5–45)
BASOPHILS # BLD AUTO: 0.05 THOUSANDS/ΜL (ref 0–0.1)
BASOPHILS NFR BLD AUTO: 1 % (ref 0–1)
BILIRUB SERPL-MCNC: 0.6 MG/DL (ref 0.2–1)
BUN SERPL-MCNC: 14 MG/DL (ref 5–25)
CALCIUM SERPL-MCNC: 9 MG/DL (ref 8.3–10.1)
CHLORIDE SERPL-SCNC: 96 MMOL/L (ref 100–108)
CO2 SERPL-SCNC: 29 MMOL/L (ref 21–32)
CREAT SERPL-MCNC: 0.67 MG/DL (ref 0.6–1.3)
EOSINOPHIL # BLD AUTO: 0.02 THOUSAND/ΜL (ref 0–0.61)
EOSINOPHIL NFR BLD AUTO: 0 % (ref 0–6)
ERYTHROCYTE [DISTWIDTH] IN BLOOD BY AUTOMATED COUNT: 14.9 % (ref 11.6–15.1)
GFR SERPL CREATININE-BSD FRML MDRD: 80 ML/MIN/1.73SQ M
GLUCOSE SERPL-MCNC: 107 MG/DL (ref 65–140)
HCT VFR BLD AUTO: 45.3 % (ref 34.8–46.1)
HGB BLD-MCNC: 13.8 G/DL (ref 11.5–15.4)
IMM GRANULOCYTES # BLD AUTO: 0.03 THOUSAND/UL (ref 0–0.2)
IMM GRANULOCYTES NFR BLD AUTO: 0 % (ref 0–2)
LIPASE SERPL-CCNC: 251 U/L (ref 73–393)
LYMPHOCYTES # BLD AUTO: 2.76 THOUSANDS/ΜL (ref 0.6–4.47)
LYMPHOCYTES NFR BLD AUTO: 28 % (ref 14–44)
MCH RBC QN AUTO: 27.8 PG (ref 26.8–34.3)
MCHC RBC AUTO-ENTMCNC: 30.5 G/DL (ref 31.4–37.4)
MCV RBC AUTO: 91 FL (ref 82–98)
MONOCYTES # BLD AUTO: 0.64 THOUSAND/ΜL (ref 0.17–1.22)
MONOCYTES NFR BLD AUTO: 7 % (ref 4–12)
NEUTROPHILS # BLD AUTO: 6.22 THOUSANDS/ΜL (ref 1.85–7.62)
NEUTS SEG NFR BLD AUTO: 64 % (ref 43–75)
NRBC BLD AUTO-RTO: 0 /100 WBCS
PLATELET # BLD AUTO: 370 THOUSANDS/UL (ref 149–390)
PMV BLD AUTO: 10.7 FL (ref 8.9–12.7)
POTASSIUM SERPL-SCNC: 4 MMOL/L (ref 3.5–5.3)
PROT SERPL-MCNC: 8.8 G/DL (ref 6.4–8.2)
RBC # BLD AUTO: 4.97 MILLION/UL (ref 3.81–5.12)
SODIUM SERPL-SCNC: 132 MMOL/L (ref 136–145)
T4 FREE SERPL-MCNC: 0.79 NG/DL (ref 0.76–1.46)
TSH SERPL DL<=0.05 MIU/L-ACNC: 16.4 UIU/ML (ref 0.36–3.74)
WBC # BLD AUTO: 9.72 THOUSAND/UL (ref 4.31–10.16)

## 2020-06-12 PROCEDURE — 84439 ASSAY OF FREE THYROXINE: CPT

## 2020-06-12 PROCEDURE — 84443 ASSAY THYROID STIM HORMONE: CPT

## 2020-06-12 PROCEDURE — 82150 ASSAY OF AMYLASE: CPT

## 2020-06-12 PROCEDURE — 85025 COMPLETE CBC W/AUTO DIFF WBC: CPT

## 2020-06-12 PROCEDURE — 80053 COMPREHEN METABOLIC PANEL: CPT

## 2020-06-12 PROCEDURE — 83690 ASSAY OF LIPASE: CPT

## 2020-06-12 PROCEDURE — 36415 COLL VENOUS BLD VENIPUNCTURE: CPT

## 2020-06-18 ENCOUNTER — TELEPHONE (OUTPATIENT)
Dept: INTERNAL MEDICINE CLINIC | Facility: CLINIC | Age: 85
End: 2020-06-18

## 2020-06-25 ENCOUNTER — TELEPHONE (OUTPATIENT)
Dept: INTERNAL MEDICINE CLINIC | Facility: CLINIC | Age: 85
End: 2020-06-25

## 2020-07-14 ENCOUNTER — TELEPHONE (OUTPATIENT)
Dept: INTERNAL MEDICINE CLINIC | Facility: CLINIC | Age: 85
End: 2020-07-14

## 2020-07-14 NOTE — TELEPHONE ENCOUNTER
If she feels this is giving her those symptoms, she is going to have to stop it and stay off of it for at least a week or two

## 2020-07-14 NOTE — TELEPHONE ENCOUNTER
Patient is still taking the gabapentin 100 mg capsule, one time a day  She feels this is making her sick to her stomach and very shaky  She tried to stop taking this and she felt shaky and started to take again  She would like to know if she should try to stop taking this again? She is constantly thirsty and her mouth has a salty taste  She is taking Vitamin D  Offered to try and schedule appt but declined  Please advise        Call back #[phone]

## 2020-07-17 ENCOUNTER — TELEPHONE (OUTPATIENT)
Dept: INTERNAL MEDICINE CLINIC | Facility: CLINIC | Age: 85
End: 2020-07-17

## 2020-07-17 NOTE — TELEPHONE ENCOUNTER
Patient would like to know if it would be ok to just take the Gabapentin every other day? Patient is afraid if she stops it all together she may have a seizure  She also wanted to know if you got any thyroid results yet?     Contact # [phone]

## 2020-07-23 ENCOUNTER — TELEPHONE (OUTPATIENT)
Dept: NEUROLOGY | Facility: CLINIC | Age: 85
End: 2020-07-23

## 2020-07-23 NOTE — TELEPHONE ENCOUNTER
Patient calling in stating that she needs to reschedule her botox  Call transferred to [facility]  for rescheduling

## 2020-08-14 ENCOUNTER — TELEPHONE (OUTPATIENT)
Dept: NEUROLOGY | Facility: CLINIC | Age: 85
End: 2020-08-14

## 2020-08-14 NOTE — TELEPHONE ENCOUNTER
PT asked to have botox appt moved to next week  I explained to her that Dr Shaunna Rock is off next week and we will keep her appt as scheduled

## 2020-08-27 ENCOUNTER — PROCEDURE VISIT (OUTPATIENT)
Dept: NEUROLOGY | Facility: CLINIC | Age: 85
End: 2020-08-27
Payer: MEDICARE

## 2020-08-27 DIAGNOSIS — G24.3 TORTICOLLIS, SPASMODIC: Primary | ICD-10-CM

## 2020-08-27 DIAGNOSIS — G24.3 SPASMODIC TORTICOLLIS: ICD-10-CM

## 2020-08-27 PROCEDURE — 95874 GUIDE NERV DESTR NEEDLE EMG: CPT | Performed by: PSYCHIATRY & NEUROLOGY

## 2020-08-27 PROCEDURE — 64616 CHEMODENERV MUSC NECK DYSTON: CPT | Performed by: PSYCHIATRY & NEUROLOGY

## 2020-08-27 NOTE — PROGRESS NOTES
Chemodenervation    Date/Time: 8/27/2020 2:15 PM  Performed by: Elsie Gaxiola MD  Authorized by: Elsie Gaxiola MD     Procedure details:     Guidance: EMG    Post-procedure details:     Chemodenervation:  Neck, excluding muscles of the larynx    Neck Muscle Location[de-identified]  Bilateral neck muscle    Indication:  Torticollis  Three 100 unit vial's of Botox each from lot number  D3 with an expiration March 2023  were diluted with 2 mL of normal saline to produce a 50 unit per cc dilution  Following aseptic technique a hollow bore 37 mm 27-gauge injectable EMG needle was utilized to identify various muscles in the cervical region  EMG demonstrated rapid firing normal-appearing motor potentials  The following muscles were infiltrated:     Right Splenius Capitis           50 Units (2 Sites)  Right Trapezius                     25 Units (2 Sites)  Left Trapezius                        25 Units (2 Sites)  Left Sternocleidomastoid      100 Units (3 Sites)  Right Sternocleidomastoid     75 units (3 sites)     Total Botox Injected                       275 units  Total Botox wasted                          25 units       Any minimal bleeding was controlled with pressure  The patient tolerated the procedure well with no complications  There maintaining Botox was discarded as no appropriate Medicare patients were being done   Anticipate reinjection in 3 months

## 2020-09-09 ENCOUNTER — OFFICE VISIT (OUTPATIENT)
Dept: NEUROLOGY | Facility: CLINIC | Age: 85
End: 2020-09-09
Payer: MEDICARE

## 2020-09-09 VITALS — DIASTOLIC BLOOD PRESSURE: 82 MMHG | SYSTOLIC BLOOD PRESSURE: 134 MMHG | HEART RATE: 78 BPM

## 2020-09-09 DIAGNOSIS — G24.3 TORTICOLLIS, SPASMODIC: Primary | ICD-10-CM

## 2020-09-09 PROCEDURE — 99213 OFFICE O/P EST LOW 20 MIN: CPT | Performed by: PSYCHIATRY & NEUROLOGY

## 2020-09-09 NOTE — PROGRESS NOTES
Apurva Oropeza is a 80 y o  female returns in follow-up today with history of torticollis    Assessment:  1  Torticollis, spasmodic        Plan:  Repeat Botox in December  Follow-up afterwards    Discussion:  Madhuri Up has had a definite improvement in her head tremors following recent Botox injection  She denies any adverse effect  Anticipate repeating Botox in December and I will see her in follow-up after that      Subjective:    HPI  Madhuri puente returns in follow-up today  She had Botox injections a couple of weeks ago and denies any adverse effects from this  She specifically notes no problems with speech swallowing or breathing  Since the injection there has been a definite improvement in her head tremors  She notes some mild pressure in the left posterior cervical region but otherwise denies any pain  Past Medical History:   Diagnosis Date    Hypertension     Right foot drop     Torticollis        Family History:  Family History   Problem Relation Age of Onset    Cancer Mother     Heart attack Father         as per [employer]        Past Surgical History:  Past Surgical History:   Procedure Laterality Date    TIBIA FRACTURE SURGERY Right 2000    TOTAL HIP ARTHROPLASTY Bilateral        Social History:   reports that she has never smoked  She has never used smokeless tobacco  She reports that she does not drink alcohol or use drugs      Allergies:  Metoprolol      Current Outpatient Medications:     Cholecalciferol (VITAMIN D3) 1000 units CAPS, Take 1 capsule by mouth daily, Disp: , Rfl:     gabapentin (NEURONTIN) 100 mg capsule, take 1 capsule by mouth twice a day (Patient taking differently: Take 100 mg by mouth daily at bedtime ), Disp: 60 capsule, Rfl: 2    busPIRone (BUSPAR) 5 mg tablet, Take 1 tablet (5 mg total) by mouth 2 (two) times a day (Patient not taking: Reported on 12/18/2019), Disp: 60 tablet, Rfl: 5    escitalopram (LEXAPRO) 5 mg tablet, Take 1 tablet (5 mg total) by mouth daily (Patient not taking: Reported on 9/9/2020), Disp: 30 tablet, Rfl: 5    levothyroxine 25 mcg tablet, TAKE 1 TABLET ONCE DAILY EARLY MORNING (Patient not taking: Reported on 4/23/2020), Disp: 30 tablet, Rfl: 5    I have reviewed the past medical, social and family history, current medications, allergies, vitals, review of systems and updated this information as appropriate today     Objective:    Vitals:  Blood pressure 134/82, pulse 78  Physical Exam    Neurological Exam  GENERAL:  Well-developed well-nourished woman in no acute distress  HEENT/NECK: Head is atraumatic normocephalic, neck is supple with his significant reduction and head tremor  NEUROLOGIC:  Mental Status: Awake and alert without aphasia  Cranial Nerves: Extraocular movements are full  Face is symmetrical              ROS:    Review of Systems   Constitutional: Negative  Negative for appetite change and fever  HENT: Positive for rhinorrhea  Negative for hearing loss, tinnitus, trouble swallowing and voice change  Eyes: Negative  Negative for photophobia and pain  Respiratory: Negative  Negative for shortness of breath  Cardiovascular: Negative  Negative for palpitations  Gastrointestinal: Positive for abdominal pain  Negative for constipation, nausea and vomiting  Endocrine: Negative  Negative for cold intolerance  Genitourinary: Negative  Negative for dysuria, enuresis, frequency and urgency  Musculoskeletal: Positive for gait problem  Negative for back pain, myalgias and neck pain  Skin: Negative  Negative for rash  Neurological: Positive for tremors, weakness and numbness  Negative for dizziness, seizures, syncope, facial asymmetry, speech difficulty, light-headedness and headaches  Hematological: Negative  Does not bruise/bleed easily  Psychiatric/Behavioral: Positive for sleep disturbance  Negative for confusion and hallucinations

## 2020-09-25 ENCOUNTER — TELEPHONE (OUTPATIENT)
Dept: NEUROLOGY | Facility: CLINIC | Age: 85
End: 2020-09-25

## 2020-09-25 NOTE — TELEPHONE ENCOUNTER
Called and left message that Dr Shaquille Abdi will not be in the office on 12/28/20, and need to reschedule appt

## 2020-09-29 ENCOUNTER — TELEPHONE (OUTPATIENT)
Dept: INTERNAL MEDICINE CLINIC | Facility: CLINIC | Age: 85
End: 2020-09-29

## 2020-10-27 ENCOUNTER — DOCUMENTATION (OUTPATIENT)
Dept: NEUROLOGY | Facility: CLINIC | Age: 85
End: 2020-10-27

## 2020-11-25 DIAGNOSIS — G24.3 TORTICOLLIS, SPASMODIC: ICD-10-CM

## 2020-11-25 RX ORDER — GABAPENTIN 100 MG/1
100 CAPSULE ORAL
Qty: 60 CAPSULE | Refills: 0 | Status: SHIPPED | OUTPATIENT
Start: 2020-11-25 | End: 2021-01-17

## 2020-12-08 ENCOUNTER — TELEPHONE (OUTPATIENT)
Dept: NEUROLOGY | Facility: CLINIC | Age: 85
End: 2020-12-08

## 2021-01-15 ENCOUNTER — TELEPHONE (OUTPATIENT)
Dept: INTERNAL MEDICINE CLINIC | Facility: CLINIC | Age: 86
End: 2021-01-15

## 2021-01-15 NOTE — TELEPHONE ENCOUNTER
Pt doesn't have fever,    Still not feeling well    Complains of stomach  Shes Taking her gabapentin every other day     Sounds like to me she should be assesed? But pt states no virtual capabilities at all, cant come in    Daughter even stays away at this point  Also having trouble with mychart and would like vaccine as soon as she can      ( I will talk to kira about this)

## 2021-01-16 DIAGNOSIS — G24.3 TORTICOLLIS, SPASMODIC: ICD-10-CM

## 2021-01-17 RX ORDER — GABAPENTIN 100 MG/1
CAPSULE ORAL
Qty: 30 CAPSULE | Refills: 0 | Status: SHIPPED | OUTPATIENT
Start: 2021-01-17 | End: 2021-04-12

## 2021-01-19 ENCOUNTER — TELEMEDICINE (OUTPATIENT)
Dept: INTERNAL MEDICINE CLINIC | Facility: CLINIC | Age: 86
End: 2021-01-19
Payer: MEDICARE

## 2021-01-19 DIAGNOSIS — R14.0 ABDOMINAL BLOATING: ICD-10-CM

## 2021-01-19 DIAGNOSIS — F41.9 ANXIETY: Primary | ICD-10-CM

## 2021-01-19 DIAGNOSIS — E03.9 ACQUIRED HYPOTHYROIDISM: Chronic | ICD-10-CM

## 2021-01-19 PROCEDURE — 99442 PR PHYS/QHP TELEPHONE EVALUATION 11-20 MIN: CPT | Performed by: INTERNAL MEDICINE

## 2021-01-19 PROCEDURE — 1124F ACP DISCUSS-NO DSCNMKR DOCD: CPT | Performed by: INTERNAL MEDICINE

## 2021-01-19 NOTE — PROGRESS NOTES
Virtual Brief Visit    Assessment/Plan:    Problem List Items Addressed This Visit        Endocrine    Hypothyroidism (Chronic)    Relevant Orders    T4, free    TSH, 3rd generation    CBC and differential    Comprehensive metabolic panel       Other    Anxiety - Primary      Other Visit Diagnoses     Abdominal bloating              BMI Counseling: There is no height or weight on file to calculate BMI  The BMI is below normal  Patient advised to gain weight  Reason for visit is   Chief Complaint   Patient presents with    Abdominal Pain    Virtual Brief Visit        Encounter provider Silvana Reyes MD    Provider located at [address] [place] [address]  [facility]  [address] [place] [id number] [phone]    Recent Visits  Date Type Provider Dept   01/15/21 Telephone Verena Mosqueda [facility] [place]   Showing recent visits within past 7 days and meeting all other requirements     Today's Visits  Date Type Provider Dept   01/19/21 Telemedicine Silvana Reyes MD [facility] [place]   Showing today's visits and meeting all other requirements     Future Appointments  No visits were found meeting these conditions  Showing future appointments within next 150 days and meeting all other requirements        After connecting through telephone, the patient was identified by name and date of birth  Av Guerin was informed that this is a telemedicine visit and that the visit is being conducted through telephone  My office door was closed  No one else was in the room  She acknowledged consent and understanding of privacy and security of the platform  The patient has agreed to participate and understands she can discontinue the visit at any time  Patient is aware this is a billable service       Subjective    Av Guerin is a 80 y o  female who is being seen today via virtual visit because of numerous restrictions and concerns placed on in person interactions secondary to the [place] pandemic  Miya Urbina Patient is being seen today by virtual visit on the telephone  She has no means of visual communication  She is dealing with the same issues with her stomach  She has not left the house in months  She states her stomach feels bloated all the time  But if she drinks soda such as coke, it does make her "burp" and makes her feel better  This has been going on for over a year at least   She still will not take the thyroid medicine or her anxiety/ depression medicine  Myself and her daughter have tried numerous times  Past Medical History:   Diagnosis Date    Hypertension     Right foot drop     Torticollis        Past Surgical History:   Procedure Laterality Date    TIBIA FRACTURE SURGERY Right 2000    TOTAL HIP ARTHROPLASTY Bilateral        Current Outpatient Medications   Medication Sig Dispense Refill    Cholecalciferol (VITAMIN D3) 1000 units CAPS Take 1 capsule by mouth daily      gabapentin (NEURONTIN) 100 mg capsule take 1 capsule by mouth at bedtime 30 capsule 0    busPIRone (BUSPAR) 5 mg tablet Take 1 tablet (5 mg total) by mouth 2 (two) times a day (Patient not taking: Reported on 12/18/2019) 60 tablet 5    escitalopram (LEXAPRO) 5 mg tablet Take 1 tablet (5 mg total) by mouth daily (Patient not taking: Reported on 9/9/2020) 30 tablet 5    levothyroxine 25 mcg tablet TAKE 1 TABLET ONCE DAILY EARLY MORNING (Patient not taking: Reported on 4/23/2020) 30 tablet 5     No current facility-administered medications for this visit  Allergies   Allergen Reactions    Metoprolol Nausea Only       Review of Systems   Respiratory: Negative for shortness of breath  Cardiovascular: Negative for chest pain  Gastrointestinal: Negative for abdominal pain, constipation and diarrhea  There were no vitals filed for this visit        I spent 15 minutes directly with the patient during this visit    VIRTUAL VISIT [facility] acknowledges that she has consented to an online visit or consultation  She understands that the online visit is based solely on information provided by her, and that, in the absence of a face-to-face physical evaluation by the physician, the diagnosis she receives is both limited and provisional in terms of accuracy and completeness  This is not intended to replace a full medical face-to-face evaluation by the physician  Juana Quiñones understands and accepts these terms

## 2021-01-21 ENCOUNTER — TELEPHONE (OUTPATIENT)
Dept: NEUROLOGY | Facility: CLINIC | Age: 86
End: 2021-01-21

## 2021-01-21 NOTE — TELEPHONE ENCOUNTER
Spoke w daughter Sherman Lazar (on old comm consent)    Needs to cx today's BOTOX - Mom not feeling well   - not Covid    Told dtr that Trish Cook will c/b shortly  Sending IB to Trish Cook

## 2021-02-15 ENCOUNTER — TELEPHONE (OUTPATIENT)
Dept: INTERNAL MEDICINE CLINIC | Facility: CLINIC | Age: 86
End: 2021-02-15

## 2021-02-15 NOTE — TELEPHONE ENCOUNTER
Pt coming into [facility] neurology on the 18th and they need a referal put into epic please with that ins     Its for torticolis marlen

## 2021-02-17 ENCOUNTER — TELEPHONE (OUTPATIENT)
Dept: NEUROLOGY | Facility: CLINIC | Age: 86
End: 2021-02-17

## 2021-02-17 DIAGNOSIS — G24.3 SPASMODIC TORTICOLLIS: Primary | ICD-10-CM

## 2021-02-17 NOTE — TELEPHONE ENCOUNTER
OK to "Epic" referral for followup with patient's established neurologist for ongoing treatment of established chronic problem?

## 2021-02-17 NOTE — TELEPHONE ENCOUNTER
Pt just needs Epic Amb RF to [facility]  No insurance referral, just [facility]      Dx: I09 2

## 2021-02-17 NOTE — TELEPHONE ENCOUNTER
Made daughter aware that 2-18-21 appointment is cancelled due to inclement weather, and re-scheduled for Friday 2-19-21 at 11:50 am

## 2021-02-18 ENCOUNTER — DOCUMENTATION (OUTPATIENT)
Dept: NEUROLOGY | Facility: CLINIC | Age: 86
End: 2021-02-18

## 2021-02-18 NOTE — PROGRESS NOTES
Type Date User Summary Attachment   General 02/18/2021  3:33 PM Melecio Shirley care coordination  -   Note    Botox- no authorization needed   Please use our stock      Thank you        Emmanuelle Espitia

## 2021-02-21 DIAGNOSIS — E03.9 ACQUIRED HYPOTHYROIDISM: Chronic | ICD-10-CM

## 2021-02-22 RX ORDER — LEVOTHYROXINE SODIUM 0.03 MG/1
TABLET ORAL
Qty: 30 TABLET | Refills: 5 | Status: SHIPPED | OUTPATIENT
Start: 2021-02-22 | End: 2022-02-28

## 2021-04-08 ENCOUNTER — PROCEDURE VISIT (OUTPATIENT)
Dept: NEUROLOGY | Facility: CLINIC | Age: 86
End: 2021-04-08
Payer: MEDICARE

## 2021-04-08 VITALS — TEMPERATURE: 98.8 F | SYSTOLIC BLOOD PRESSURE: 152 MMHG | DIASTOLIC BLOOD PRESSURE: 98 MMHG

## 2021-04-08 DIAGNOSIS — G24.3 SPASMODIC TORTICOLLIS: ICD-10-CM

## 2021-04-08 PROCEDURE — 95874 GUIDE NERV DESTR NEEDLE EMG: CPT | Performed by: PSYCHIATRY & NEUROLOGY

## 2021-04-08 PROCEDURE — 64616 CHEMODENERV MUSC NECK DYSTON: CPT | Performed by: PSYCHIATRY & NEUROLOGY

## 2021-04-08 NOTE — PROGRESS NOTES
Chemodenervation     Date/Time 4/8/2021 12:52 PM     Performed by  Neal Rincon MD     Authorized by Neal Rincno MD      Procedure details     Guidance: EMG     Post-procedure details      Chemodenervation:  Neck, excluding muscles of the larynx    Neck Muscle Location[de-identified]  Bilateral neck muscle     Indication:  Torticollis  Three 100 unit vial's of Botox each from lot number  [id number] with an expiration December 2023  were diluted with 2 mL of normal saline to produce a 50 unit per cc dilution  Following aseptic technique a hollow bore 37 mm 27-gauge injectable EMG needle was utilized to identify various muscles in the cervical region  EMG demonstrated rapid firing normal-appearing motor potentials  The following muscles were infiltrated:     Right Splenius Capitis           50 Units (2 Sites)  Right Trapezius                     25 Units (2 Sites)  Left Trapezius                        25 Units (2 Sites)  Left Sternocleidomastoid      100 Units (3 Sites)  Right Sternocleidomastoid     75 units (3 sites)     Total Botox Injected                       275 units  Total Botox wasted                          25 units       Any minimal bleeding was controlled with pressure   The patient tolerated the procedure well with no complications

## 2021-04-10 DIAGNOSIS — G24.3 TORTICOLLIS, SPASMODIC: ICD-10-CM

## 2021-04-12 RX ORDER — GABAPENTIN 100 MG/1
CAPSULE ORAL
Qty: 30 CAPSULE | Refills: 0 | Status: SHIPPED | OUTPATIENT
Start: 2021-04-12 | End: 2021-05-24

## 2021-05-17 ENCOUNTER — DOCUMENTATION (OUTPATIENT)
Dept: NEUROLOGY | Facility: CLINIC | Age: 86
End: 2021-05-17

## 2021-05-17 NOTE — PROGRESS NOTES
Patient is scheduled with Dr Farzana Pina on 7/8/2021 in the [facility] location

## 2021-05-17 NOTE — PROGRESS NOTES
Type Date User Summary Attachment   General 05/13/2021  7:58 AM Charis Other care coordination  -   Note    Botox- no authorization needed   Please use our stock      Thank you,     Dedra Coker

## 2021-05-23 DIAGNOSIS — G24.3 TORTICOLLIS, SPASMODIC: ICD-10-CM

## 2021-05-24 RX ORDER — GABAPENTIN 100 MG/1
CAPSULE ORAL
Qty: 30 CAPSULE | Refills: 3 | Status: SHIPPED | OUTPATIENT
Start: 2021-05-24 | End: 2022-03-10

## 2021-10-19 ENCOUNTER — APPOINTMENT (OUTPATIENT)
Dept: LAB | Facility: CLINIC | Age: 86
End: 2021-10-19
Payer: MEDICARE

## 2021-10-19 DIAGNOSIS — E03.9 ACQUIRED HYPOTHYROIDISM: Chronic | ICD-10-CM

## 2021-10-19 LAB
ALBUMIN SERPL BCP-MCNC: 3 G/DL (ref 3.5–5)
ALP SERPL-CCNC: 132 U/L (ref 46–116)
ALT SERPL W P-5'-P-CCNC: 17 U/L (ref 12–78)
ANION GAP SERPL CALCULATED.3IONS-SCNC: 1 MMOL/L (ref 4–13)
AST SERPL W P-5'-P-CCNC: 20 U/L (ref 5–45)
BASOPHILS # BLD AUTO: 0.04 THOUSANDS/ΜL (ref 0–0.1)
BASOPHILS NFR BLD AUTO: 0 % (ref 0–1)
BILIRUB SERPL-MCNC: 0.44 MG/DL (ref 0.2–1)
BUN SERPL-MCNC: 14 MG/DL (ref 5–25)
CALCIUM ALBUM COR SERPL-MCNC: 10 MG/DL (ref 8.3–10.1)
CALCIUM SERPL-MCNC: 9.2 MG/DL (ref 8.3–10.1)
CHLORIDE SERPL-SCNC: 100 MMOL/L (ref 100–108)
CO2 SERPL-SCNC: 32 MMOL/L (ref 21–32)
CREAT SERPL-MCNC: 0.64 MG/DL (ref 0.6–1.3)
EOSINOPHIL # BLD AUTO: 0.01 THOUSAND/ΜL (ref 0–0.61)
EOSINOPHIL NFR BLD AUTO: 0 % (ref 0–6)
ERYTHROCYTE [DISTWIDTH] IN BLOOD BY AUTOMATED COUNT: 14.6 % (ref 11.6–15.1)
GFR SERPL CREATININE-BSD FRML MDRD: 81 ML/MIN/1.73SQ M
GLUCOSE P FAST SERPL-MCNC: 129 MG/DL (ref 65–99)
HCT VFR BLD AUTO: 43 % (ref 34.8–46.1)
HGB BLD-MCNC: 13.2 G/DL (ref 11.5–15.4)
IMM GRANULOCYTES # BLD AUTO: 0.04 THOUSAND/UL (ref 0–0.2)
IMM GRANULOCYTES NFR BLD AUTO: 0 % (ref 0–2)
LYMPHOCYTES # BLD AUTO: 2.38 THOUSANDS/ΜL (ref 0.6–4.47)
LYMPHOCYTES NFR BLD AUTO: 26 % (ref 14–44)
MCH RBC QN AUTO: 28.1 PG (ref 26.8–34.3)
MCHC RBC AUTO-ENTMCNC: 30.7 G/DL (ref 31.4–37.4)
MCV RBC AUTO: 92 FL (ref 82–98)
MONOCYTES # BLD AUTO: 0.44 THOUSAND/ΜL (ref 0.17–1.22)
MONOCYTES NFR BLD AUTO: 5 % (ref 4–12)
NEUTROPHILS # BLD AUTO: 6.33 THOUSANDS/ΜL (ref 1.85–7.62)
NEUTS SEG NFR BLD AUTO: 69 % (ref 43–75)
NRBC BLD AUTO-RTO: 0 /100 WBCS
PLATELET # BLD AUTO: 387 THOUSANDS/UL (ref 149–390)
PMV BLD AUTO: 10.7 FL (ref 8.9–12.7)
POTASSIUM SERPL-SCNC: 3.9 MMOL/L (ref 3.5–5.3)
PROT SERPL-MCNC: 8.9 G/DL (ref 6.4–8.2)
RBC # BLD AUTO: 4.69 MILLION/UL (ref 3.81–5.12)
SODIUM SERPL-SCNC: 133 MMOL/L (ref 136–145)
T4 FREE SERPL-MCNC: 0.78 NG/DL (ref 0.76–1.46)
TSH SERPL DL<=0.05 MIU/L-ACNC: 18.3 UIU/ML (ref 0.36–3.74)
WBC # BLD AUTO: 9.24 THOUSAND/UL (ref 4.31–10.16)

## 2021-10-19 PROCEDURE — 85025 COMPLETE CBC W/AUTO DIFF WBC: CPT

## 2021-10-19 PROCEDURE — 84439 ASSAY OF FREE THYROXINE: CPT

## 2021-10-19 PROCEDURE — 84443 ASSAY THYROID STIM HORMONE: CPT

## 2021-10-19 PROCEDURE — 80053 COMPREHEN METABOLIC PANEL: CPT

## 2021-10-19 PROCEDURE — 36415 COLL VENOUS BLD VENIPUNCTURE: CPT

## 2021-10-28 ENCOUNTER — TELEPHONE (OUTPATIENT)
Dept: INTERNAL MEDICINE CLINIC | Facility: CLINIC | Age: 86
End: 2021-10-28

## 2021-11-03 ENCOUNTER — TELEPHONE (OUTPATIENT)
Dept: NEUROLOGY | Facility: CLINIC | Age: 86
End: 2021-11-03

## 2021-11-04 ENCOUNTER — TELEPHONE (OUTPATIENT)
Dept: NEUROLOGY | Facility: CLINIC | Age: 86
End: 2021-11-04

## 2022-02-15 ENCOUNTER — TELEPHONE (OUTPATIENT)
Dept: NEUROLOGY | Facility: CLINIC | Age: 87
End: 2022-02-15

## 2022-02-28 DIAGNOSIS — E03.9 ACQUIRED HYPOTHYROIDISM: Chronic | ICD-10-CM

## 2022-02-28 RX ORDER — LEVOTHYROXINE SODIUM 0.03 MG/1
TABLET ORAL
Qty: 30 TABLET | Refills: 0 | Status: SHIPPED | OUTPATIENT
Start: 2022-02-28

## 2022-02-28 NOTE — TELEPHONE ENCOUNTER
I sent refills but patient is due for appointment  Please contact them and schedule a followup appointment  She has not been here in over a year

## 2022-03-10 DIAGNOSIS — G24.3 TORTICOLLIS, SPASMODIC: ICD-10-CM

## 2022-03-10 RX ORDER — GABAPENTIN 100 MG/1
CAPSULE ORAL
Qty: 30 CAPSULE | Refills: 0 | Status: SHIPPED | OUTPATIENT
Start: 2022-03-10

## 2022-03-10 NOTE — TELEPHONE ENCOUNTER
Spoke with the patient and advised her that she's due for an appointment before any further refills; she verbalized understanding and will give a call back to schedule

## 2022-04-09 DIAGNOSIS — E03.9 ACQUIRED HYPOTHYROIDISM: Chronic | ICD-10-CM

## 2022-04-09 DIAGNOSIS — G24.3 TORTICOLLIS, SPASMODIC: ICD-10-CM

## 2022-04-11 RX ORDER — LEVOTHYROXINE SODIUM 0.03 MG/1
TABLET ORAL
Qty: 30 TABLET | Refills: 0 | OUTPATIENT
Start: 2022-04-11

## 2022-04-11 RX ORDER — GABAPENTIN 100 MG/1
CAPSULE ORAL
Qty: 30 CAPSULE | Refills: 0 | OUTPATIENT
Start: 2022-04-11

## 2022-05-09 DIAGNOSIS — G24.3 TORTICOLLIS, SPASMODIC: ICD-10-CM

## 2022-05-09 DIAGNOSIS — E03.9 ACQUIRED HYPOTHYROIDISM: Chronic | ICD-10-CM

## 2022-05-09 RX ORDER — GABAPENTIN 100 MG/1
CAPSULE ORAL
Qty: 30 CAPSULE | Refills: 0 | OUTPATIENT
Start: 2022-05-09

## 2022-05-09 RX ORDER — LEVOTHYROXINE SODIUM 0.03 MG/1
TABLET ORAL
Qty: 30 TABLET | Refills: 0 | OUTPATIENT
Start: 2022-05-09

## 2022-06-08 DIAGNOSIS — G24.3 TORTICOLLIS, SPASMODIC: ICD-10-CM

## 2022-06-08 DIAGNOSIS — E03.9 ACQUIRED HYPOTHYROIDISM: Chronic | ICD-10-CM

## 2022-06-08 RX ORDER — LEVOTHYROXINE SODIUM 0.03 MG/1
TABLET ORAL
Qty: 30 TABLET | Refills: 0 | OUTPATIENT
Start: 2022-06-08

## 2022-06-08 RX ORDER — GABAPENTIN 100 MG/1
CAPSULE ORAL
Qty: 30 CAPSULE | Refills: 0 | OUTPATIENT
Start: 2022-06-08

## 2022-07-07 ENCOUNTER — DOCUMENTATION (OUTPATIENT)
Dept: INTERNAL MEDICINE CLINIC | Facility: CLINIC | Age: 87
End: 2022-07-07

## 2022-07-07 NOTE — PROGRESS NOTES
Attempted to reach lazaro she is due for an AWV with Dr Doc Kahn, We are unable to approve refills for her until she is able to come in  Im unable to leave her a message her mailbox is full  Can someone help contact and schedule her

## 2022-07-13 NOTE — PROGRESS NOTES
Maybe try her daughter Vishal Old [phone], she's on a communication consent form in media  Patient must be without medications at this point?

## 2022-07-21 NOTE — PROGRESS NOTES
Tried calling the patient again and lmom advising to contact the office back  We've tried reaching out to her multiple times to no avail

## 2022-11-05 DIAGNOSIS — G24.3 TORTICOLLIS, SPASMODIC: ICD-10-CM

## 2022-11-07 RX ORDER — GABAPENTIN 100 MG/1
100 CAPSULE ORAL
Qty: 30 CAPSULE | Refills: 0 | Status: SHIPPED | OUTPATIENT
Start: 2022-11-07

## 2022-11-07 RX ORDER — GABAPENTIN 100 MG/1
CAPSULE ORAL
Qty: 30 CAPSULE | Refills: 0 | OUTPATIENT
Start: 2022-11-07

## 2023-03-10 ENCOUNTER — DOCUMENTATION (OUTPATIENT)
Dept: NEUROLOGY | Facility: CLINIC | Age: 88
End: 2023-03-10